# Patient Record
Sex: MALE | Race: WHITE | NOT HISPANIC OR LATINO | Employment: FULL TIME | URBAN - METROPOLITAN AREA
[De-identification: names, ages, dates, MRNs, and addresses within clinical notes are randomized per-mention and may not be internally consistent; named-entity substitution may affect disease eponyms.]

---

## 2022-08-24 LAB
ALBUMIN SERPL BCG-MCNC: 4.4 G/DL (ref 3.5–5.2)
ALP SERPL-CCNC: 78 U/L (ref 40–129)
ALT SERPL W P-5'-P-CCNC: 31 U/L (ref 10–50)
ANION GAP SERPL CALCULATED.3IONS-SCNC: 12 MMOL/L (ref 7–15)
AST SERPL W P-5'-P-CCNC: 28 U/L (ref 10–50)
BASOPHILS # BLD AUTO: 0 10E3/UL (ref 0–0.2)
BASOPHILS NFR BLD AUTO: 0 %
BILIRUB SERPL-MCNC: 0.8 MG/DL
BUN SERPL-MCNC: 14.6 MG/DL (ref 6–20)
CALCIUM SERPL-MCNC: 10 MG/DL (ref 8.6–10)
CHLORIDE SERPL-SCNC: 101 MMOL/L (ref 98–107)
CREAT SERPL-MCNC: 0.9 MG/DL (ref 0.67–1.17)
DEPRECATED HCO3 PLAS-SCNC: 23 MMOL/L (ref 22–29)
EOSINOPHIL # BLD AUTO: 0.1 10E3/UL (ref 0–0.7)
EOSINOPHIL NFR BLD AUTO: 0 %
ERYTHROCYTE [DISTWIDTH] IN BLOOD BY AUTOMATED COUNT: 12.8 % (ref 10–15)
GFR SERPL CREATININE-BSD FRML MDRD: >90 ML/MIN/1.73M2
GLUCOSE SERPL-MCNC: 125 MG/DL (ref 70–99)
HCT VFR BLD AUTO: 49 % (ref 40–53)
HGB BLD-MCNC: 16.7 G/DL (ref 13.3–17.7)
HOLD SPECIMEN: NORMAL
IMM GRANULOCYTES # BLD: 0 10E3/UL
IMM GRANULOCYTES NFR BLD: 0 %
LIPASE SERPL-CCNC: 631 U/L (ref 13–60)
LYMPHOCYTES # BLD AUTO: 2.2 10E3/UL (ref 0.8–5.3)
LYMPHOCYTES NFR BLD AUTO: 16 %
MCH RBC QN AUTO: 29.6 PG (ref 26.5–33)
MCHC RBC AUTO-ENTMCNC: 34.1 G/DL (ref 31.5–36.5)
MCV RBC AUTO: 87 FL (ref 78–100)
MONOCYTES # BLD AUTO: 1.8 10E3/UL (ref 0–1.3)
MONOCYTES NFR BLD AUTO: 13 %
NEUTROPHILS # BLD AUTO: 9.5 10E3/UL (ref 1.6–8.3)
NEUTROPHILS NFR BLD AUTO: 71 %
NRBC # BLD AUTO: 0 10E3/UL
NRBC BLD AUTO-RTO: 0 /100
PLATELET # BLD AUTO: 211 10E3/UL (ref 150–450)
POTASSIUM SERPL-SCNC: 4.2 MMOL/L (ref 3.4–5.3)
PROT SERPL-MCNC: 8.1 G/DL (ref 6.4–8.3)
RBC # BLD AUTO: 5.65 10E6/UL (ref 4.4–5.9)
SODIUM SERPL-SCNC: 136 MMOL/L (ref 136–145)
TROPONIN T SERPL HS-MCNC: <6 NG/L
WBC # BLD AUTO: 13.6 10E3/UL (ref 4–11)

## 2022-08-24 PROCEDURE — 84484 ASSAY OF TROPONIN QUANT: CPT | Performed by: EMERGENCY MEDICINE

## 2022-08-24 PROCEDURE — 85025 COMPLETE CBC W/AUTO DIFF WBC: CPT | Performed by: EMERGENCY MEDICINE

## 2022-08-24 PROCEDURE — 83690 ASSAY OF LIPASE: CPT | Performed by: EMERGENCY MEDICINE

## 2022-08-24 PROCEDURE — 80053 COMPREHEN METABOLIC PANEL: CPT | Performed by: EMERGENCY MEDICINE

## 2022-08-24 PROCEDURE — 93005 ELECTROCARDIOGRAM TRACING: CPT

## 2022-08-24 PROCEDURE — 99284 EMERGENCY DEPT VISIT MOD MDM: CPT

## 2022-08-24 PROCEDURE — 36415 COLL VENOUS BLD VENIPUNCTURE: CPT | Performed by: EMERGENCY MEDICINE

## 2022-08-25 ENCOUNTER — HOSPITAL ENCOUNTER (EMERGENCY)
Facility: CLINIC | Age: 42
Discharge: HOME | End: 2022-08-25
Attending: EMERGENCY MEDICINE | Admitting: EMERGENCY MEDICINE
Payer: OTHER GOVERNMENT

## 2022-08-25 VITALS
TEMPERATURE: 97.9 F | HEIGHT: 70 IN | WEIGHT: 215 LBS | OXYGEN SATURATION: 98 % | BODY MASS INDEX: 30.78 KG/M2 | RESPIRATION RATE: 18 BRPM | HEART RATE: 79 BPM | SYSTOLIC BLOOD PRESSURE: 134 MMHG | DIASTOLIC BLOOD PRESSURE: 101 MMHG

## 2022-08-25 DIAGNOSIS — K85.00 IDIOPATHIC ACUTE PANCREATITIS WITHOUT INFECTION OR NECROSIS: ICD-10-CM

## 2022-08-25 LAB
ATRIAL RATE - MUSE: 81 BPM
DIASTOLIC BLOOD PRESSURE - MUSE: NORMAL MMHG
HOLD SPECIMEN: NORMAL
INTERPRETATION ECG - MUSE: NORMAL
P AXIS - MUSE: 13 DEGREES
PR INTERVAL - MUSE: 170 MS
QRS DURATION - MUSE: 140 MS
QT - MUSE: 394 MS
QTC - MUSE: 457 MS
R AXIS - MUSE: 6 DEGREES
SYSTOLIC BLOOD PRESSURE - MUSE: NORMAL MMHG
T AXIS - MUSE: 18 DEGREES
VENTRICULAR RATE- MUSE: 81 BPM

## 2022-08-25 PROCEDURE — 250N000011 HC RX IP 250 OP 636: Performed by: EMERGENCY MEDICINE

## 2022-08-25 PROCEDURE — 250N000013 HC RX MED GY IP 250 OP 250 PS 637: Performed by: EMERGENCY MEDICINE

## 2022-08-25 RX ORDER — ONDANSETRON 4 MG/1
4 TABLET, ORALLY DISINTEGRATING ORAL ONCE
Status: COMPLETED | OUTPATIENT
Start: 2022-08-25 | End: 2022-08-25

## 2022-08-25 RX ORDER — OXYCODONE HYDROCHLORIDE 5 MG/1
5 TABLET ORAL EVERY 6 HOURS PRN
Qty: 12 TABLET | Refills: 0 | Status: SHIPPED | OUTPATIENT
Start: 2022-08-25 | End: 2023-02-16

## 2022-08-25 RX ORDER — OXYCODONE HYDROCHLORIDE 5 MG/1
5 TABLET ORAL ONCE
Status: COMPLETED | OUTPATIENT
Start: 2022-08-25 | End: 2022-08-25

## 2022-08-25 RX ORDER — ONDANSETRON 4 MG/1
4 TABLET, ORALLY DISINTEGRATING ORAL EVERY 6 HOURS PRN
Qty: 12 TABLET | Refills: 0 | Status: SHIPPED | OUTPATIENT
Start: 2022-08-25 | End: 2022-08-28

## 2022-08-25 RX ADMIN — OXYCODONE HYDROCHLORIDE 5 MG: 5 TABLET ORAL at 01:22

## 2022-08-25 RX ADMIN — ONDANSETRON 4 MG: 4 TABLET, ORALLY DISINTEGRATING ORAL at 01:22

## 2022-08-25 ASSESSMENT — ENCOUNTER SYMPTOMS
NAUSEA: 1
BACK PAIN: 1
VOMITING: 0
ABDOMINAL PAIN: 1
MYALGIAS: 1

## 2022-08-25 ASSESSMENT — ACTIVITIES OF DAILY LIVING (ADL): ADLS_ACUITY_SCORE: 35

## 2022-08-25 NOTE — ED TRIAGE NOTES
Here for epigastric abdominal pain radiating to back and left shoulder on Monday morning while drinking coffee associated with nausea. Pain resolve when not drinking coffee. Same pain occur again on Tuesday morning when drinking coffee at that time. Denies any chest pain. feels constipated, last normal bowel movement on Tuesday morning, tried metamucil but not helping. ABCs intact.      Triage Assessment     Row Name 08/24/22 8766       Triage Assessment (Adult)    Airway WDL WDL       Respiratory WDL    Respiratory WDL WDL       Cardiac WDL    Cardiac WDL WDL

## 2022-08-25 NOTE — ED PROVIDER NOTES
"  History   Chief Complaint:  Abdominal Pain    The history is provided by the patient.      Deandre Lemus is a 42 year old male who presents with abdominal pain. The patient reports onset of epigastric abdominal pain three days ago that resolved itself but presented again two days ago. States the pain radiates to his left shoulder and back. Adds that the pain prevents him from sleeping and has caused him to be nauseous. Notes that use of a heating pad helped alleviate some discomfort. Endorses minimal alcohol use. Denies chest pain, vomiting, and history of abdominal surgery.    Review of Systems   Cardiovascular: Negative for chest pain.   Gastrointestinal: Positive for abdominal pain and nausea. Negative for vomiting.   Musculoskeletal: Positive for back pain and myalgias.   All other systems reviewed and are negative.    Allergies:  No Known Allergies    Medications:  No Known Medications    Past Medical History:     No Known Past Medical History    Past Surgical History:    Vasectomy    Social History:  Presents with spouse  Presents via private vehicle     Physical Exam     Patient Vitals for the past 24 hrs:   BP Temp Temp src Pulse Resp SpO2 Height Weight   08/24/22 2305  144/95 97.9  F (36.6  C) Oral 84 18 97 % 1.778 m (5' 10\") 97.5 kg (215 lb)     Physical Exam  Nursing note and vitals reviewed.  Constitutional: Cooperative.   HENT:   Mouth/Throat: Mucous membranes are normal.   Cardiovascular: Normal rate, regular rhythm and normal heart sounds.  No murmur.  Pulmonary/Chest: Effort normal and breath sounds normal. No respiratory distress. No wheezes. No rales.   Abdominal: Soft. Normal appearance and bowel sounds are normal. No distension. Mild epigastric tenderness. There is no rigidity and no guarding. No tenderness with deep RUQ palpation.    Neurological: Alert. Oriented x4  Skin: Skin is warm and dry.    Psychiatric: Normal mood and affect.     Emergency Department Course   ECG  ECG taken at " 2301, ECG read at 2304  Normal sinus rhythm  Right bundle branch block   Rate 81 bpm. PA interval 170 ms. QRS duration 140 ms. QT/QTc 394/457 ms. P-R-T axes 13 6 18.     Laboratory:  Labs Ordered and Resulted from Time of ED Arrival to Time of ED Departure   LIPASE - Abnormal       Result Value    Lipase 631 (*)    COMPREHENSIVE METABOLIC PANEL - Abnormal    Sodium 136      Potassium 4.2      Creatinine 0.90      Urea Nitrogen 14.6      Chloride 101      Carbon Dioxide (CO2) 23      Anion Gap 12      Glucose 125 (*)     Calcium 10.0      Protein Total 8.1      Albumin 4.4      Bilirubin Total 0.8      Alkaline Phosphatase 78      AST 28      ALT 31      GFR Estimate >90     CBC WITH PLATELETS AND DIFFERENTIAL - Abnormal    WBC Count 13.6 (*)     RBC Count 5.65      Hemoglobin 16.7      Hematocrit 49.0      MCV 87      MCH 29.6      MCHC 34.1      RDW 12.8      Platelet Count 211      % Neutrophils 71      % Lymphocytes 16      % Monocytes 13      % Eosinophils 0      % Basophils 0      % Immature Granulocytes 0      NRBCs per 100 WBC 0      Absolute Neutrophils 9.5 (*)     Absolute Lymphocytes 2.2      Absolute Monocytes 1.8 (*)     Absolute Eosinophils 0.1      Absolute Basophils 0.0      Absolute Immature Granulocytes 0.0      Absolute NRBCs 0.0     TROPONIN T, HIGH SENSITIVITY - Normal    Troponin T, High Sensitivity <6       Reviewed:  I reviewed nursing notes, vitals and past medical history    Assessments:  0108 I obtained history and examined the patient as noted above.    Interventions:  0122 Oxycodone, 5 mg, PO  0122 Ondansetron, 4 mg, PO    Disposition:  The patient was discharged to home.     Impression & Plan   Medical Decision Making:  Deandre Lemus is a 42 year old male who presents with epigastric abdominal pain.  The differential diagnosis would include GERD, GIB, esophageal spasm, atypical cardiac sx's, pancreatitis, biliary colic or gallstone disease, AAA, gastroenteritis, gastritis, large vs  small bowel disease, etc.  Based on history, PE and labs, the most likely explanation is pancreatitis.  Ultrasound of gallbladder deferred after collaborative conversation with the patient. I feel gallstones less likely.   Abdominal exam is benign at this point.   Pain control in ED was successful.  Based on this, will discharge home.  Patients questions answered.      Diagnosis:    ICD-10-CM    1. Idiopathic acute pancreatitis without infection or necrosis  K85.00      Discharge Medications:  New Prescriptions    ONDANSETRON (ZOFRAN ODT) 4 MG ODT TAB    Take 1 tablet (4 mg) by mouth every 6 hours as needed    OXYCODONE (ROXICODONE) 5 MG TABLET    Take 1 tablet (5 mg) by mouth every 6 hours as needed for severe pain     Scribe Disclosure:  I, Tavia Hendricks, am serving as a scribe at 1:08 AM on 8/25/2022 to document services personally performed by Evelio Cardoso MD based on my observations and the provider's statements to me.      Evelio Cardoso MD  08/25/22 [id number]

## 2022-09-26 ENCOUNTER — TRANSFERRED RECORDS (OUTPATIENT)
Dept: HEALTH INFORMATION MANAGEMENT | Facility: CLINIC | Age: 42
End: 2022-09-26

## 2022-10-04 ENCOUNTER — OFFICE VISIT (OUTPATIENT)
Dept: PEDIATRICS | Facility: CLINIC | Age: 42
End: 2022-10-04
Payer: OTHER GOVERNMENT

## 2022-10-04 VITALS
HEART RATE: 82 BPM | RESPIRATION RATE: 12 BRPM | TEMPERATURE: 98.8 F | SYSTOLIC BLOOD PRESSURE: 112 MMHG | DIASTOLIC BLOOD PRESSURE: 60 MMHG | OXYGEN SATURATION: 96 % | HEIGHT: 70 IN | WEIGHT: 215 LBS | BODY MASS INDEX: 30.78 KG/M2

## 2022-10-04 DIAGNOSIS — K85.90 ACUTE PANCREATITIS, UNSPECIFIED COMPLICATION STATUS, UNSPECIFIED PANCREATITIS TYPE: Primary | ICD-10-CM

## 2022-10-04 DIAGNOSIS — Z13.220 SCREENING CHOLESTEROL LEVEL: ICD-10-CM

## 2022-10-04 DIAGNOSIS — Z11.59 NEED FOR HEPATITIS C SCREENING TEST: ICD-10-CM

## 2022-10-04 DIAGNOSIS — Z13.220 SCREENING FOR HYPERLIPIDEMIA: ICD-10-CM

## 2022-10-04 DIAGNOSIS — Z13.1 SCREENING FOR DIABETES MELLITUS: ICD-10-CM

## 2022-10-04 DIAGNOSIS — Z11.4 SCREENING FOR HIV (HUMAN IMMUNODEFICIENCY VIRUS): ICD-10-CM

## 2022-10-04 LAB — HBA1C MFR BLD: 5.5 % (ref 0–5.6)

## 2022-10-04 PROCEDURE — 87389 HIV-1 AG W/HIV-1&-2 AB AG IA: CPT | Performed by: STUDENT IN AN ORGANIZED HEALTH CARE EDUCATION/TRAINING PROGRAM

## 2022-10-04 PROCEDURE — 86803 HEPATITIS C AB TEST: CPT | Performed by: STUDENT IN AN ORGANIZED HEALTH CARE EDUCATION/TRAINING PROGRAM

## 2022-10-04 PROCEDURE — 83036 HEMOGLOBIN GLYCOSYLATED A1C: CPT | Performed by: STUDENT IN AN ORGANIZED HEALTH CARE EDUCATION/TRAINING PROGRAM

## 2022-10-04 PROCEDURE — 99203 OFFICE O/P NEW LOW 30 MIN: CPT | Performed by: STUDENT IN AN ORGANIZED HEALTH CARE EDUCATION/TRAINING PROGRAM

## 2022-10-04 PROCEDURE — 36415 COLL VENOUS BLD VENIPUNCTURE: CPT | Performed by: STUDENT IN AN ORGANIZED HEALTH CARE EDUCATION/TRAINING PROGRAM

## 2022-10-04 PROCEDURE — 80061 LIPID PANEL: CPT | Performed by: STUDENT IN AN ORGANIZED HEALTH CARE EDUCATION/TRAINING PROGRAM

## 2022-10-04 NOTE — LETTER
October 6, 2022      Deandre Lemus  [address]  [place] [address]        Dear Deandre,     Here are your recent lab results:     Your standard one-time screening for HIV was normal.     Your standard one-time screening for hepatitis C virus was negative, or normal.     Your cholesterol levels look good! The LDL is actually normal.     Your hemoglobin a1c (screens for diabetes) was normal.     Resulted Orders   HIV Antigen Antibody Combo   Result Value Ref Range    HIV Antigen Antibody Combo Nonreactive Nonreactive      Comment:      HIV-1 p24 Ag & HIV-1/HIV-2 Ab Not Detected   Hepatitis C Screen Reflex to HCV RNA Quant and Genotype   Result Value Ref Range    Hepatitis C Antibody Nonreactive Nonreactive    Narrative    Assay performance characteristics have not been established for newborns, infants, and children.   Lipid panel reflex to direct LDL Non-fasting   Result Value Ref Range    Cholesterol 181 <200 mg/dL    Triglycerides 172 (H) <150 mg/dL    Direct Measure HDL 44 >=40 mg/dL    LDL Cholesterol Calculated 103 (H) <=100 mg/dL    Non HDL Cholesterol 137 (H) <130 mg/dL    Patient Fasting > 8hrs? No     Narrative    Cholesterol  Desirable:  <200 mg/dL    Triglycerides  Normal:  Less than 150 mg/dL  Borderline High:  150-199 mg/dL  High:  200-499 mg/dL  Very High:  Greater than or equal to 500 mg/dL    Direct Measure HDL  Female:  Greater than or equal to 50 mg/dL   Male:  Greater than or equal to 40 mg/dL    LDL Cholesterol  Desirable:  <100mg/dL  Above Desirable:  100-129 mg/dL   Borderline High:  130-159 mg/dL   High:  160-189 mg/dL   Very High:  >= 190 mg/dL    Non HDL Cholesterol  Desirable:  130 mg/dL  Above Desirable:  130-159 mg/dL  Borderline High:  160-189 mg/dL  High:  190-219 mg/dL  Very High:  Greater than or equal to 220 mg/dL   Hemoglobin A1c   Result Value Ref Range    Hemoglobin A1C 5.5 0.0 - 5.6 %      Comment:      Normal <5.7%   Prediabetes 5.7-6.4%    Diabetes 6.5% or higher     Note:  Adopted from ADA consensus guidelines.       Please let us know if you have questions or concerns.           Meche Beckman MD   Internal Medicine & Pediatrics   [facility]

## 2022-10-04 NOTE — PROGRESS NOTES
"  Assessment & Plan   Acute pancreatitis  Getting MRCP with [facility]. No/minimal symptoms currently. Likely alcohol related and does not require further testing for autoimmune causes are alpha1 antitrypsin deficiency.     Screening for HIV (human immunodeficiency virus)  - HIV Antigen Antibody Combo  - HIV Antigen Antibody Combo    Need for hepatitis C screening test  - Hepatitis C Screen Reflex to HCV RNA Quant and Genotype  - Hepatitis C Screen Reflex to HCV RNA Quant and Genotype    Screening for hyperlipidemia  - Lipid panel reflex to direct LDL Non-fasting  - Lipid panel reflex to direct LDL Non-fasting    Screening for diabetes mellitus  - Hemoglobin A1c  - Hemoglobin A1c    Screening cholesterol level   - lipid panel    ED Follow up of Pancreatitis  Patient is scheduled for MRCP this Saturday at [facility]. Currently already following appropriate diet and lifestyle modifications. Unclear if his supplement use may have also contributed to his flare. Possibly alcohol induced based on social hx.     Review of prior external note(s) from [facility] ED and [facility] referral order         BMI:   Estimated body mass index is 30.85 kg/m  as calculated from the following:    Height as of this encounter: 1.778 m (5' 10\").    Weight as of this encounter: 97.5 kg (215 lb).       Patient was seen and discussed with Dr. Mcgee.    Return in about 4 months (around 2/16/2023).    STAFF NOTE:  I have seen the patient, discussed with the resident, was present during critical portion of visit, and was available to furnish services throughout the visit.  I agree with the history, physical and plan as documented above.    Meche Mcgee MD  Internal Medicine-Pediatrics        Mickey Carrera is a 42 year old, presenting for the following health issues:  Hospital F/U and Establish Care      [facility]       ED/UC Followup:  Pt looking to establish to care     Facility:  [facility] ER  Date of visit: " "08/25/2022   Reason for visit: Abdominal pain/Acute pancreatitis   Current Status: Pt states \"The pain comes and goes, not as severe but it is still there\"     Has itching with oxycodone or percocet. Has not been using much of anything. Uses Motrin if needed. Heat seems to help. Movement     Pain gets 1-3 but he feels the pain daily    He is scheduled for an MRCP on 10/8/22 at [facility]  He has cut down on sugar and coffee. Tried to cut out fatty foods. Trying to avoid a lot of carbs. Stopped drinking alcohol.   No ep of Nausea or vomiting since the ED visit.     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 He now is an casual drinker.         Quit smoking 2010. Used smokeless tobacco till 2017. Admits that when he found out about his dad's cancer diagnosis, he drank more heavily to manage his emotions. He has not have any alcohol since mid-August 2022.          Works as [employer] team     to wife, Kids           Objective    /60 (BP Location: Right arm, Patient Position: Sitting, Cuff Size: Adult Large)   Pulse 82   Temp 98.8  F (37.1  C) (Temporal)   Resp 12   Ht 1.778 m (5' 10\")   Wt 97.5 kg (215 lb)   SpO2 96%   BMI 30.85 kg/m    Body mass index is 30.85 kg/m .  Physical Exam   GEN: Alert and appropriately interactive for age  EYES: Eyes grossly " normal to inspection, conjunctivae and sclerae normal  RESP: Breathing comfortably on room air   CV: Warm and well perfused peripheral extremities   MS: no gross musculoskeletal defects noted, no edema  NEURO: Alert and oriented. Speech fluent.    PSYCH: Affect normal/bright. Appearance well groomed.

## 2022-10-05 LAB
CHOLEST SERPL-MCNC: 181 MG/DL
FASTING STATUS PATIENT QL REPORTED: NO
HCV AB SERPL QL IA: NONREACTIVE
HDLC SERPL-MCNC: 44 MG/DL
HIV 1+2 AB+HIV1 P24 AG SERPL QL IA: NONREACTIVE
LDLC SERPL CALC-MCNC: 103 MG/DL
NONHDLC SERPL-MCNC: 137 MG/DL
TRIGL SERPL-MCNC: 172 MG/DL

## 2022-10-09 ENCOUNTER — HOSPITAL ENCOUNTER (OUTPATIENT)
Dept: MRI IMAGING | Facility: CLINIC | Age: 42
Discharge: HOME | End: 2022-10-09
Attending: INTERNAL MEDICINE | Admitting: INTERNAL MEDICINE
Payer: OTHER GOVERNMENT

## 2022-10-09 DIAGNOSIS — Z87.19 H/O ACUTE PANCREATITIS: ICD-10-CM

## 2022-10-09 LAB — RADIOLOGIST FLAGS: ABNORMAL

## 2022-10-09 PROCEDURE — A9585 GADOBUTROL INJECTION: HCPCS | Performed by: INTERNAL MEDICINE

## 2022-10-09 PROCEDURE — 74183 MRI ABD W/O CNTR FLWD CNTR: CPT

## 2022-10-09 PROCEDURE — 255N000002 HC RX 255 OP 636: Performed by: INTERNAL MEDICINE

## 2022-10-09 RX ORDER — GADOBUTROL 604.72 MG/ML
9.5 INJECTION INTRAVENOUS ONCE
Status: COMPLETED | OUTPATIENT
Start: 2022-10-09 | End: 2022-10-09

## 2022-10-09 RX ADMIN — GADOBUTROL 9.5 ML: 604.72 INJECTION INTRAVENOUS at 09:52

## 2022-10-09 NOTE — RESULT ENCOUNTER NOTE
Cystic lesion at pancreatic head, distal esophageal mass, hepatic steatosis.  Endoscopic ultrasound with biopsy recommended.  This study was ordered by [facility] provider.  I will call patient to make sure results have been relayed and that follow up with [facility] is set up.    Meche Mcgee MD

## 2022-10-11 ENCOUNTER — TRANSFERRED RECORDS (OUTPATIENT)
Dept: HEALTH INFORMATION MANAGEMENT | Facility: CLINIC | Age: 42
End: 2022-10-11

## 2022-10-28 ENCOUNTER — OFFICE VISIT (OUTPATIENT)
Dept: PEDIATRICS | Facility: CLINIC | Age: 42
End: 2022-10-28
Payer: OTHER GOVERNMENT

## 2022-10-28 VITALS
HEART RATE: 78 BPM | HEIGHT: 70 IN | SYSTOLIC BLOOD PRESSURE: 126 MMHG | BODY MASS INDEX: 31.62 KG/M2 | OXYGEN SATURATION: 98 % | WEIGHT: 220.9 LBS | RESPIRATION RATE: 17 BRPM | TEMPERATURE: 97.6 F | DIASTOLIC BLOOD PRESSURE: 78 MMHG

## 2022-10-28 DIAGNOSIS — Z01.818 PREOP GENERAL PHYSICAL EXAM: Primary | ICD-10-CM

## 2022-10-28 DIAGNOSIS — K22.89 ESOPHAGEAL MASS: ICD-10-CM

## 2022-10-28 DIAGNOSIS — Z01.812 ENCOUNTER FOR SCREENING LABORATORY TESTING FOR COVID-19 VIRUS IN ASYMPTOMATIC PATIENT: ICD-10-CM

## 2022-10-28 DIAGNOSIS — K86.2 PANCREAS CYST: ICD-10-CM

## 2022-10-28 DIAGNOSIS — Z11.52 ENCOUNTER FOR SCREENING LABORATORY TESTING FOR COVID-19 VIRUS IN ASYMPTOMATIC PATIENT: ICD-10-CM

## 2022-10-28 PROCEDURE — 99214 OFFICE O/P EST MOD 30 MIN: CPT | Mod: 25 | Performed by: STUDENT IN AN ORGANIZED HEALTH CARE EDUCATION/TRAINING PROGRAM

## 2022-10-28 PROCEDURE — U0003 INFECTIOUS AGENT DETECTION BY NUCLEIC ACID (DNA OR RNA); SEVERE ACUTE RESPIRATORY SYNDROME CORONAVIRUS 2 (SARS-COV-2) (CORONAVIRUS DISEASE [COVID-19]), AMPLIFIED PROBE TECHNIQUE, MAKING USE OF HIGH THROUGHPUT TECHNOLOGIES AS DESCRIBED BY CMS-2020-01-R: HCPCS | Performed by: STUDENT IN AN ORGANIZED HEALTH CARE EDUCATION/TRAINING PROGRAM

## 2022-10-28 PROCEDURE — U0005 INFEC AGEN DETEC AMPLI PROBE: HCPCS | Performed by: STUDENT IN AN ORGANIZED HEALTH CARE EDUCATION/TRAINING PROGRAM

## 2022-10-28 SDOH — HEALTH STABILITY: PHYSICAL HEALTH: ON AVERAGE, HOW MANY MINUTES DO YOU ENGAGE IN EXERCISE AT THIS LEVEL?: 50 MIN

## 2022-10-28 SDOH — HEALTH STABILITY: PHYSICAL HEALTH: ON AVERAGE, HOW MANY DAYS PER WEEK DO YOU ENGAGE IN MODERATE TO STRENUOUS EXERCISE (LIKE A BRISK WALK)?: 5 DAYS

## 2022-10-28 SDOH — ECONOMIC STABILITY: TRANSPORTATION INSECURITY
IN THE PAST 12 MONTHS, HAS LACK OF TRANSPORTATION KEPT YOU FROM MEETINGS, WORK, OR FROM GETTING THINGS NEEDED FOR DAILY LIVING?: NO

## 2022-10-28 SDOH — ECONOMIC STABILITY: FOOD INSECURITY: WITHIN THE PAST 12 MONTHS, THE FOOD YOU BOUGHT JUST DIDN'T LAST AND YOU DIDN'T HAVE MONEY TO GET MORE.: NEVER TRUE

## 2022-10-28 SDOH — ECONOMIC STABILITY: INCOME INSECURITY: IN THE LAST 12 MONTHS, WAS THERE A TIME WHEN YOU WERE NOT ABLE TO PAY THE MORTGAGE OR RENT ON TIME?: NO

## 2022-10-28 SDOH — ECONOMIC STABILITY: TRANSPORTATION INSECURITY
IN THE PAST 12 MONTHS, HAS THE LACK OF TRANSPORTATION KEPT YOU FROM MEDICAL APPOINTMENTS OR FROM GETTING MEDICATIONS?: NO

## 2022-10-28 SDOH — ECONOMIC STABILITY: FOOD INSECURITY: WITHIN THE PAST 12 MONTHS, YOU WORRIED THAT YOUR FOOD WOULD RUN OUT BEFORE YOU GOT MONEY TO BUY MORE.: NEVER TRUE

## 2022-10-28 SDOH — ECONOMIC STABILITY: INCOME INSECURITY: HOW HARD IS IT FOR YOU TO PAY FOR THE VERY BASICS LIKE FOOD, HOUSING, MEDICAL CARE, AND HEATING?: NOT VERY HARD

## 2022-10-28 ASSESSMENT — LIFESTYLE VARIABLES
HOW OFTEN DO YOU HAVE SIX OR MORE DRINKS ON ONE OCCASION: LESS THAN MONTHLY
AUDIT-C TOTAL SCORE: 2
HOW OFTEN DO YOU HAVE A DRINK CONTAINING ALCOHOL: MONTHLY OR LESS
HOW MANY STANDARD DRINKS CONTAINING ALCOHOL DO YOU HAVE ON A TYPICAL DAY: 1 OR 2
SKIP TO QUESTIONS 9-10: 0

## 2022-10-28 ASSESSMENT — SOCIAL DETERMINANTS OF HEALTH (SDOH)
HOW OFTEN DO YOU ATTEND CHURCH OR RELIGIOUS SERVICES?: NEVER
HOW OFTEN DO YOU GET TOGETHER WITH FRIENDS OR RELATIVES?: ONCE A WEEK
DO YOU BELONG TO ANY CLUBS OR ORGANIZATIONS SUCH AS CHURCH GROUPS UNIONS, FRATERNAL OR ATHLETIC GROUPS, OR SCHOOL GROUPS?: YES
IN A TYPICAL WEEK, HOW MANY TIMES DO YOU TALK ON THE PHONE WITH FAMILY, FRIENDS, OR NEIGHBORS?: ONCE A WEEK

## 2022-10-28 ASSESSMENT — PAIN SCALES - GENERAL: PAINLEVEL: NO PAIN (0)

## 2022-10-28 NOTE — PROGRESS NOTES
[facility]  [address]  [address]  [place] [address]  Phone: [phone]  Fax: [phone]  Primary Provider: Meche Amador  Pre-op Performing Provider: MECHE AMADOR      PREOPERATIVE EVALUATION:  Today's date: 10/28/2022    Deandre Lemus is a 42 year old male who presents for a preoperative evaluation.    Surgical Information:  Surgery/Procedure: endoscopic ultra sound    Surgery Location: [facility]  Surgeon: Dr. Crowley   Surgery Date: 10/31/2022  Time of Surgery: 11:00am   Where patient plans to recover: At home with family  Fax number for surgical facility:     Type of Anesthesia Anticipated: General    Assessment & Plan     The proposed surgical procedure is considered INTERMEDIATE risk.    Preop general physical exam  Medically optimized for procedure. Asymptomatic COVID19 PCR in process.    Pancreas cys  Esophageal mass  Undergoing EUS       Risks and Recommendations:  The patient has the following additional risks and recommendations for perioperative complications:   - No identified additional risk factors other than previously addressed    Medication Instructions:  Patient is on no chronic medications    RECOMMENDATION:  APPROVAL GIVEN to proceed with proposed procedure, without further diagnostic evaluation.        Subjective     HPI related to upcoming procedure: Had episode of pancreatitis and patient had follow up MRCP with [facility], Found cystic lesion at pancreatic head and distal esophageal mass. Will be undergoing endoscopic ultrasound and possible biopsy.    Preop Questions 10/28/2022   1. Have you ever had a heart attack or stroke? No   2. Have you ever had surgery on your heart or blood vessels, such as a stent placement, a coronary artery bypass, or surgery on an artery in your head, neck, heart, or legs? No   3. Do you have chest pain with activity? No   4. Do you have a history of  heart failure? No   5. Do  you currently have a cold, bronchitis or symptoms of other infection? No   6. Do you have a cough, shortness of breath, or wheezing? No   7. Do you or anyone in your family have previous history of blood clots? YES - sister had history of it    8. Do you or does anyone in your family have a serious bleeding problem such as prolonged bleeding following surgeries or cuts? No   9. Have you ever had problems with anemia or been told to take iron pills? No   10. Have you had any abnormal blood loss such as black, tarry or bloody stools? No   11. Have you ever had a blood transfusion? No   12. Are you willing to have a blood transfusion if it is medically needed before, during, or after your surgery? Yes   13. Have you or any of your relatives ever had problems with anesthesia? UNKNOWN   14. Do you have sleep apnea, excessive snoring or daytime drowsiness? No   15. Do you have any artifical heart valves or other implanted medical devices like a pacemaker, defibrillator, or continuous glucose monitor? No   16. Do you have artificial joints? No   17. Are you allergic to latex? No       Preoperative Review of :   reviewed - controlled substances prescribed by other outside provider(s).      Status of Chronic Conditions:  See problem list for active medical problems.  Problems all longstanding and stable, except as noted/documented.  See ROS for pertinent symptoms related to these conditions.      Review of Systems  Constitutional, neuro, ENT, endocrine, pulmonary, cardiac, gastrointestinal, genitourinary, musculoskeletal, integument and psychiatric systems are negative, except as otherwise noted.    Patient Active Problem List    Diagnosis Date Noted     Acute pancreatitis 10/04/2022     Priority: Medium     Likely alcohol related  Getting MRCP with [facility]         Past Medical History:   Diagnosis Date     Closed fracture of left hip with routine healing     2007     RBBB (right bundle branch block)   "    Past Surgical History:   Procedure Laterality Date     VASECTOMY       WISDOM TOOTH EXTRACTION       Current Outpatient Medications   Medication Sig Dispense Refill     COVID-19 mRNA vacc, Moderna, 100 MCG/0.5ML injection        oxyCODONE (ROXICODONE) 5 MG tablet Take 1 tablet (5 mg) by mouth every 6 hours as needed for severe pain 12 tablet 0       No Known Allergies     Social History     Tobacco Use     Smoking status: Former     Types: Cigarettes     Smokeless tobacco: Not on file   Substance Use Topics     Alcohol use: Yes     History   Drug Use Unknown         Objective     /78 (BP Location: Right arm, Patient Position: Sitting, Cuff Size: Adult Large)   Pulse 78   Temp 97.6  F (36.4  C) (Tympanic)   Resp 17   Ht 1.778 m (5' 10\")   Wt 100.2 kg (220 lb 14.4 oz)   SpO2 98%   BMI 31.70 kg/m      Physical Exam  GENERAL APPEARANCE: healthy, alert and no distress  HENT: ear canals and TM's normal and nose and mouth without ulcers or lesions  RESP: lungs clear to auscultation - no rales, rhonchi or wheezes  CV: regular rate and rhythm, normal S1 S2, no S3 or S4 and no murmur, click or rub   ABDOMEN: soft, nontender, no HSM or masses  NEURO: Normal strength and tone, sensory exam grossly normal, mentation intact and speech normal    Recent Labs   Lab Test 10/04/22  1446 08/24/22  2319   HGB  --  16.7   PLT  --  211   NA  --  136   POTASSIUM  --  4.2   CR  --  0.90   A1C 5.5  --         Diagnostics:  No labs were ordered during this visit.   No EKG required for low risk surgery (cataract, skin procedure, breast biopsy, etc).    Revised Cardiac Risk Index (RCRI):  The patient has the following serious cardiovascular risks for perioperative complications:   - No serious cardiac risks = 0 points     RCRI Interpretation: 0 points: Class I (very low risk - 0.4% complication rate)           Signed Electronically by: Meche Mcgee MD  Copy of this evaluation report is provided to requesting " physician.

## 2022-10-28 NOTE — PATIENT INSTRUCTIONS
Preparing for Your Surgery  Getting started  A nurse will call you to review your health history and instructions. They will give you an arrival time based on your scheduled surgery time. Please be ready to share:    Your doctor's clinic name and phone number    Your medical, surgical and anesthesia history    A list of allergies and sensitivities    A list of medicines, including herbal treatments and over-the-counter drugs    Whether the patient has a legal guardian (ask how to send us the papers in advance)  Please tell us if you're pregnant--or if there's any chance you might be pregnant. Some surgeries may injure a fetus (unborn baby), so they require a pregnancy test. Surgeries that are safe for a fetus don't always need a test, and you can choose whether to have one.   If you have a child who's having surgery, please ask for a copy of Preparing for Your Child's Surgery.    Preparing for surgery    Within 10 to 30 days of surgery: Have a pre-op exam (sometimes called an H&P, or History and Physical). This can be done at a clinic or pre-operative center.  ? If you're having a , you may not need this exam. Talk to your care team.    At your pre-op exam, talk to your care team about all medicines you take. If you need to stop any medicines before surgery, ask when to start taking them again.  ? We do this for your safety. Many medicines can make you bleed too much during surgery. Some change how well surgery (anesthesia) drugs work.    Call your insurance company to let them know you're having surgery. (If you don't have insurance, call [phone].)    Call your clinic if there's any change in your health. This includes signs of a cold or flu (sore throat, runny nose, cough, rash, fever). It also includes a scrape or scratch near the surgery site.    If you have questions on the day of surgery, call your hospital or surgery center.  COVID testing  You may need to be tested for COVID-19 before having  surgery. If so, we will give you instructions (or click here).  Eating and drinking guidelines  For your safety: Unless your surgeon tells you otherwise, follow the guidelines below.    Eat and drink as usual until 8 hours before surgery. After that, no food or milk.    Drink clear liquids until 2 hours before surgery. These are liquids you can see through, like water, Gatorade and Propel Water. You may also have black coffee and tea (no cream or milk).    Nothing by mouth within 2 hours of surgery. This includes gum, candy and breath mints.    If you drink alcohol: Stop drinking it the night before surgery.    If your care team tells you to take medicine on the morning of surgery, it's okay to take it with a sip of water.  Preventing infection    Shower or bathe the night before and morning of your surgery. Follow the instructions your clinic gave you. (If no instructions, use regular soap.)    Don't shave or clip hair near your surgery site. We'll remove the hair if needed.    Don't smoke or vape the morning of surgery. You may chew nicotine gum up to 2 hours before surgery. A nicotine patch is okay.  ? Note: Some surgeries require you to completely quit smoking and nicotine. Check with your surgeon.    Your care team will make every effort to keep you safe from infection. We will:  ? Clean our hands often with soap and water (or an alcohol-based hand rub).  ? Clean the skin at your surgery site with a special soap that kills germs.  ? Give you a special gown to keep you warm. (Cold raises the risk of infection.)  ? Wear special hair covers, masks, gowns and gloves during surgery.  ? Give antibiotic medicine, if prescribed. Not all surgeries need antibiotics.  What to bring on the day of surgery    Photo ID and insurance card    Copy of your health care directive, if you have one    Glasses and hearing aides (bring cases)  ? You can't wear contacts during surgery    Inhaler and eye drops, if you use them (tell us  about these when you arrive)    CPAP machine or breathing device, if you use them    A few personal items, if spending the night    If you have . . .  ? A pacemaker, ICD (cardiac defibrillator) or other implant: Bring the ID card.  ? An implanted stimulator: Bring the remote control.  ? A legal guardian: Bring a copy of the certified (court-stamped) guardianship papers.  Please remove any jewelry, including body piercings. Leave jewelry and other valuables at home.  If you're going home the day of surgery    You must have a responsible adult drive you home. They should stay with you overnight as well.    If you don't have someone to stay with you, and you aren't safe to go home alone, we may keep you overnight. Insurance often won't pay for this.  After surgery  If it's hard to control your pain or you need more pain medicine, please call your surgeon's office.  Questions?   If you have any questions for your care team, list them here: _________________________________________________________________________________________________________________________________________________________________________ ____________________________________ ____________________________________ ____________________________________  For informational purposes only. Not to replace the advice of your health care provider. Copyright   2003, 2019 [facility]. All rights reserved. Clinically reviewed by Chelsea Reid MD. [employer] [id number] - REV 07/22.

## 2022-10-29 LAB — SARS-COV-2 RNA RESP QL NAA+PROBE: NEGATIVE

## 2022-11-04 ENCOUNTER — TRANSFERRED RECORDS (OUTPATIENT)
Dept: HEALTH INFORMATION MANAGEMENT | Facility: CLINIC | Age: 42
End: 2022-11-04

## 2023-02-12 ENCOUNTER — HEALTH MAINTENANCE LETTER (OUTPATIENT)
Age: 43
End: 2023-02-12

## 2023-02-12 ASSESSMENT — ENCOUNTER SYMPTOMS
JOINT SWELLING: 0
MYALGIAS: 1
DIARRHEA: 0
EYE PAIN: 0
COUGH: 0
NAUSEA: 0
ABDOMINAL PAIN: 0
HEARTBURN: 0
HEMATURIA: 0
CONSTIPATION: 0
SORE THROAT: 0
FEVER: 0
DIZZINESS: 0
WEAKNESS: 0
DYSURIA: 0
PALPITATIONS: 0
PARESTHESIAS: 0
NERVOUS/ANXIOUS: 0
HEADACHES: 0
CHILLS: 0
HEMATOCHEZIA: 0
SHORTNESS OF BREATH: 0
ARTHRALGIAS: 1
FREQUENCY: 0

## 2023-02-16 ENCOUNTER — OFFICE VISIT (OUTPATIENT)
Dept: PEDIATRICS | Facility: CLINIC | Age: 43
End: 2023-02-16
Payer: OTHER GOVERNMENT

## 2023-02-16 VITALS
WEIGHT: 217 LBS | SYSTOLIC BLOOD PRESSURE: 124 MMHG | BODY MASS INDEX: 31.07 KG/M2 | HEART RATE: 83 BPM | HEIGHT: 70 IN | TEMPERATURE: 98.7 F | RESPIRATION RATE: 14 BRPM | OXYGEN SATURATION: 97 % | DIASTOLIC BLOOD PRESSURE: 68 MMHG

## 2023-02-16 DIAGNOSIS — Z12.11 SCREEN FOR COLON CANCER: ICD-10-CM

## 2023-02-16 DIAGNOSIS — Z23 NEED FOR TDAP VACCINATION: ICD-10-CM

## 2023-02-16 DIAGNOSIS — Z00.00 ROUTINE GENERAL MEDICAL EXAMINATION AT A HEALTH CARE FACILITY: Primary | ICD-10-CM

## 2023-02-16 PROCEDURE — 90471 IMMUNIZATION ADMIN: CPT | Performed by: STUDENT IN AN ORGANIZED HEALTH CARE EDUCATION/TRAINING PROGRAM

## 2023-02-16 PROCEDURE — 99396 PREV VISIT EST AGE 40-64: CPT | Mod: 25 | Performed by: STUDENT IN AN ORGANIZED HEALTH CARE EDUCATION/TRAINING PROGRAM

## 2023-02-16 PROCEDURE — 90715 TDAP VACCINE 7 YRS/> IM: CPT | Performed by: STUDENT IN AN ORGANIZED HEALTH CARE EDUCATION/TRAINING PROGRAM

## 2023-02-16 ASSESSMENT — ENCOUNTER SYMPTOMS
FREQUENCY: 0
DIZZINESS: 0
COUGH: 0
SORE THROAT: 0
ABDOMINAL PAIN: 0
PARESTHESIAS: 0
NAUSEA: 0
PALPITATIONS: 0
ARTHRALGIAS: 1
DYSURIA: 0
HEMATOCHEZIA: 0
CHILLS: 0
DIARRHEA: 0
CONSTIPATION: 0
HEADACHES: 0
WEAKNESS: 0
MYALGIAS: 1
HEMATURIA: 0
NERVOUS/ANXIOUS: 0
HEARTBURN: 0
EYE PAIN: 0
FEVER: 0
JOINT SWELLING: 0
SHORTNESS OF BREATH: 0

## 2023-02-16 NOTE — PROGRESS NOTES
SUBJECTIVE:   CC: Deandre is an 43 year old who presents for preventative health visit.     Patient has been advised of split billing requirements and indicates understanding: Yes     Healthy Habits:     Getting at least 3 servings of Calcium per day:  NO    Bi-annual eye exam:  NO    Dental care twice a year:  NO    Sleep apnea or symptoms of sleep apnea:  None    Diet:  Regular (no restrictions), Carbohydrate counting, Gluten-free/reduced and Other    Frequency of exercise:  4-5 days/week    Duration of exercise:  45-60 minutes    Taking medications regularly:  Yes    Medication side effects:  None    PHQ-2 Total Score: 0    Additional concerns today:  No      Seeing physical therapy for mobility, stiffness    Took PPI for 2 weeks then has been good     Esophageal mass found to be leiomyoma on endoscopic ultrasound and biopsy     Today's PHQ-2 Score:   PHQ-2 (  [employer]) 2023   Q1: Little interest or pleasure in doing things 0   Q2: Feeling down, depressed or hopeless 0   PHQ-2 Score 0   Q1: Little interest or pleasure in doing things Not at all   Q2: Feeling down, depressed or hopeless Not at all   PHQ-2 Score 0     Social History     Tobacco Use     Smoking status: Former     Packs/day: 1.00     Years: 10.00     Pack years: 10.00     Types: Cigarettes, Dip, chew, snus or snuff     Start date: 1997     Quit date: 2011     Years since quittin.1     Smokeless tobacco: Former     Quit date: 2021     Tobacco comments:     Former user; no interest in using again   Substance Use Topics     Alcohol use: Not Currently     Comment: Quit approx 2022; no desire to continue       Alcohol Use 2023   Prescreen: >3 drinks/day or >7 drinks/week? No     Last PSA: No results found for: PSA    Reviewed orders with patient. Reviewed health maintenance and updated orders accordingly - Yes    Reviewed and updated as needed this visit by clinical staff   Tobacco  Allergies  Medsis Rico  "Marsha on 2/16/2023 at 10:29 AM    Reviewed and updated as needed this visit by Provider                   Review of Systems   Constitutional: Negative for chills and fever.   HENT: Negative for congestion, ear pain, hearing loss and sore throat.    Eyes: Negative for pain and visual disturbance.   Respiratory: Negative for cough and shortness of breath.    Cardiovascular: Negative for chest pain, palpitations and peripheral edema.   Gastrointestinal: Negative for abdominal pain, constipation, diarrhea, heartburn, hematochezia and nausea.   Genitourinary: Negative for dysuria, frequency, genital sores, hematuria, impotence, penile discharge and urgency.   Musculoskeletal: Positive for arthralgias and myalgias. Negative for joint swelling.   Skin: Negative for rash.   Neurological: Negative for dizziness, weakness, headaches and paresthesias.   Psychiatric/Behavioral: Negative for mood changes. The patient is not nervous/anxious.      OBJECTIVE:   /68 (BP Location: Right arm, Patient Position: Sitting, Cuff Size: Adult Regular)   Pulse 83   Temp 98.7  F (37.1  C) (Temporal)   Resp 14   Ht 1.778 m (5' 10\")   Wt 98.4 kg (217 lb)   SpO2 97%   BMI 31.14 kg/m      Physical Exam  GENERAL: healthy, alert and no distress  EYES: Eyes grossly normal to inspection, and conjunctivae and sclerae normal  HENT: ear canals and TM's normal  NECK: no adenopathy, no asymmetry, masses, or scars and thyroid normal to palpation  RESP: lungs clear to auscultation - no rales, rhonchi or wheezes  CV: regular rate and rhythm, normal S1 S2, no S3 or S4, no murmur, click or rub, no peripheral edema and peripheral pulses strong  ABDOMEN: soft, nontender, no hepatosplenomegaly, no masses  MS: no gross musculoskeletal defects noted, no edema  SKIN: no suspicious lesions or rashes  NEURO: Normal strength and tone, mentation intact and speech normal  PSYCH: mentation appears normal, affect normal/bright      ASSESSMENT/PLAN:       " ICD-10-CM    1. Routine general medical examination at a health care facility  Z00.00       2. Screen for colon cancer  Z12.11             COUNSELING:   Reviewed preventive health counseling, as reflected in patient instructions        He reports that he quit smoking about 11 years ago. His smoking use included cigarettes and dip, chew, snus or snuff. He started smoking about 25 years ago. He has a 10.00 pack-year smoking history. He quit smokeless tobacco use about 13 months ago.            Meche Mcgee MD  [facility]

## 2023-08-16 ENCOUNTER — LAB (OUTPATIENT)
Dept: PEDIATRICS | Facility: CLINIC | Age: 43
End: 2023-08-16
Payer: OTHER GOVERNMENT

## 2023-08-16 DIAGNOSIS — Z12.11 SCREEN FOR COLON CANCER: ICD-10-CM

## 2024-03-07 SDOH — HEALTH STABILITY: PHYSICAL HEALTH: ON AVERAGE, HOW MANY DAYS PER WEEK DO YOU ENGAGE IN MODERATE TO STRENUOUS EXERCISE (LIKE A BRISK WALK)?: 4 DAYS

## 2024-03-07 SDOH — HEALTH STABILITY: PHYSICAL HEALTH: ON AVERAGE, HOW MANY MINUTES DO YOU ENGAGE IN EXERCISE AT THIS LEVEL?: 90 MIN

## 2024-03-07 ASSESSMENT — SOCIAL DETERMINANTS OF HEALTH (SDOH): HOW OFTEN DO YOU GET TOGETHER WITH FRIENDS OR RELATIVES?: ONCE A WEEK

## 2024-03-14 ENCOUNTER — OFFICE VISIT (OUTPATIENT)
Dept: PEDIATRICS | Facility: CLINIC | Age: 44
End: 2024-03-14
Payer: OTHER GOVERNMENT

## 2024-03-14 VITALS
HEART RATE: 84 BPM | BODY MASS INDEX: 30.64 KG/M2 | DIASTOLIC BLOOD PRESSURE: 60 MMHG | TEMPERATURE: 97.2 F | WEIGHT: 214 LBS | HEIGHT: 70 IN | OXYGEN SATURATION: 97 % | SYSTOLIC BLOOD PRESSURE: 110 MMHG

## 2024-03-14 DIAGNOSIS — Z12.11 SCREEN FOR COLON CANCER: ICD-10-CM

## 2024-03-14 DIAGNOSIS — Z00.00 ROUTINE GENERAL MEDICAL EXAMINATION AT A HEALTH CARE FACILITY: Primary | ICD-10-CM

## 2024-03-14 PROCEDURE — 99396 PREV VISIT EST AGE 40-64: CPT | Performed by: STUDENT IN AN ORGANIZED HEALTH CARE EDUCATION/TRAINING PROGRAM

## 2024-03-14 NOTE — PROGRESS NOTES
"Preventive Care Visit  [facility] EARL Mcgee MD, Internal Medicine - Pediatrics  Mar 14, 2024      Assessment & Plan     Routine general medical examination at a health care facility  Labs next physical.    Screen for colon cancer  - Colonoscopy Screening  Referral; Future              BMI  Estimated body mass index is 30.71 kg/m  as calculated from the following:    Height as of this encounter: 1.778 m (5' 10\").    Weight as of this encounter: 97.1 kg (214 lb).       Counseling  Appropriate preventive services were discussed with this patient, including applicable screening as appropriate for fall prevention, nutrition, physical activity, Tobacco-use cessation, weight loss and cognition.  Checklist reviewing preventive services available has been given to the patient.  Reviewed patient's diet, addressing concerns and/or questions.   The patient was instructed to see the dentist every 6 months.   He is at risk for psychosocial distress and has been provided with information to reduce risk.           Mickey Garza is a 44 year old, presenting for the following:  Physical           HPI  Working with  on mobility and strength        3/7/2024   General Health   How would you rate your overall physical health? Excellent   Feel stress (tense, anxious, or unable to sleep) Only a little   (!) STRESS CONCERN      3/7/2024   Nutrition   Three or more servings of calcium each day? Yes   Diet: Gluten-free/reduced   How many servings of fruit and vegetables per day? (!) 2-3   How many sweetened beverages each day? 0-1         3/7/2024   Exercise   Days per week of moderate/strenous exercise 4 days   Average minutes spent exercising at this level 90 min         3/7/2024   Social Factors   Frequency of gathering with friends or relatives Once a week   Worry food won't last until get money to buy more No   Food not last or not have enough money for food? No   Do you have housing?  Yes "   Are you worried about losing your housing? No   Lack of transportation? No   Unable to get utilities (heat,electricity)? No         3/7/2024   Dental   Dentist two times every year? (!) NO         3/7/2024   TB Screening   Were you born outside of [place]?  No         Today's PHQ-2 Score:       3/14/2024     9:42 AM   PHQ-2 (  [employer])   Q1: Little interest or pleasure in doing things 0   Q2: Feeling down, depressed or hopeless 1   PHQ-2 Score 1   Q1: Little interest or pleasure in doing things Not at all   Q2: Feeling down, depressed or hopeless Several days   PHQ-2 Score 1           3/7/2024   Substance Use   Alcohol more than 3/day or more than 7/wk Not Applicable   Do you use any other substances recreationally? No     Social History     Tobacco Use    Smoking status: Former     Packs/day: 1.00     Years: 10.00     Additional pack years: 0.00     Total pack years: 10.00     Types: Cigarettes, Dip, chew, snus or snuff     Start date: 1997     Quit date: 2011     Years since quittin.2    Smokeless tobacco: Former     Quit date: 2021    Tobacco comments:     Former user; no interest in using again   Vaping Use    Vaping Use: Never used   Substance Use Topics    Alcohol use: Not Currently     Comment: Quit approx 2022; no desire to continue    Drug use: Never           3/7/2024   STI Screening   New sexual partner(s) since last STI/HIV test? No   ASCVD Risk   The 10-year ASCVD risk score (Ceferino CORDON, et al., 2019) is: 1.7%    Values used to calculate the score:      Age: 44 years      Sex: Male      Is Non- : No      Diabetic: No      Tobacco smoker: No      Systolic Blood Pressure: 110 mmHg      Is BP treated: No      HDL Cholesterol: 44 mg/dL      Total Cholesterol: 181 mg/dL       Reviewed and updated as needed this visit by Provider                             Objective    Exam  /60 (BP Location: Right arm, Patient Position: Sitting, Cuff Size: Adult  "Large)   Pulse 84   Temp 97.2  F (36.2  C) (Tympanic)   Ht 1.778 m (5' 10\")   Wt 97.1 kg (214 lb)   SpO2 97%   BMI 30.71 kg/m     Estimated body mass index is 30.71 kg/m  as calculated from the following:    Height as of this encounter: 1.778 m (5' 10\").    Weight as of this encounter: 97.1 kg (214 lb).    Physical Exam  GENERAL: alert and no distress  EYES: Eyes grossly normal to inspection, and conjunctivae and sclerae normal  HENT: ear canals and TM's normal, nose and mouth without ulcers or lesions  NECK: no adenopathy, no asymmetry, masses, or scars  RESP: lungs clear to auscultation - no rales, rhonchi or wheezes  CV: regular rate and rhythm, normal S1 S2, no S3 or S4, no murmur, click or rub, no peripheral edema  ABDOMEN: soft, nontender, no hepatosplenomegaly, no masses  MS: no gross musculoskeletal defects noted, no edema  SKIN: no suspicious lesions or rashes  NEURO: Normal strength and tone, mentation intact and speech normal  PSYCH: mentation appears normal, affect normal/bright      Signed Electronically by: Meche Mcgee MD    "

## 2024-03-14 NOTE — PATIENT INSTRUCTIONS
Preventive Care Advice   This is general advice given by our system to help you stay healthy. However, your care team may have specific advice just for you. Please talk to your care team about your preventive care needs.  Nutrition  Eat 5 or more servings of fruits and vegetables each day.  Try wheat bread, brown rice and whole grain pasta (instead of white bread, rice, and pasta).  Get enough calcium and vitamin D. Check the label on foods and aim for 100% of the RDA (recommended daily allowance).  Lifestyle  Exercise at least 150 minutes each week   (30 minutes a day, 5 days a week).  Do muscle strengthening activities 2 days a week. These help control your weight and prevent disease.  No smoking.  Wear sunscreen to prevent skin cancer.  Have a dental exam and cleaning every 6 months.  Yearly exams  See your health care team every year to talk about:  Any changes in your health.  Any medicines your care team has prescribed.  Preventive care, family planning, and ways to prevent chronic diseases.  Shots (vaccines)   HPV shots (up to age 26), if you've never had them before.  Hepatitis B shots (up to age 59), if you've never had them before.  COVID-19 shot: Get this shot when it's due.  Flu shot: Get a flu shot every year.  Tetanus shot: Get a tetanus shot every 10 years.  Pneumococcal, hepatitis A, and RSV shots: Ask your care team if you need these based on your risk.  Shingles shot (for age 50 and up).  General health tests  Diabetes screening:  Starting at age 35, Get screened for diabetes at least every 3 years.  If you are younger than age 35, ask your care team if you should be screened for diabetes.  Cholesterol test: At age 39, start having a cholesterol test every 5 years, or more often if advised.  Bone density scan (DEXA): At age 50, ask your care team if you should have this scan for osteoporosis (brittle bones).  Hepatitis C: Get tested at least once in your life.  STIs (sexually transmitted  infections)  Before age 24: Ask your care team if you should be screened for STIs.  After age 24: Get screened for STIs if you're at risk. You are at risk for STIs (including HIV) if:  You are sexually active with more than one person.  You don't use condoms every time.  You or a partner was diagnosed with a sexually transmitted infection.  If you are at risk for HIV, ask about PrEP medicine to prevent HIV.  Get tested for HIV at least once in your life, whether you are at risk for HIV or not.  Cancer screening tests  Cervical cancer screening: If you have a cervix, begin getting regular cervical cancer screening tests at age 21. Most people who have regular screenings with normal results can stop after age 65. Talk about this with your provider.  Breast cancer scan (mammogram): If you've ever had breasts, begin having regular mammograms starting at age 40. This is a scan to check for breast cancer.  Colon cancer screening: It is important to start screening for colon cancer at age 45.  Have a colonoscopy test every 10 years (or more often if you're at risk) Or, ask your provider about stool tests like a FIT test every year or Cologuard test every 3 years.  To learn more about your testing options, visit: https://www.[employer]/200313.pdf.  For help making a decision, visit: https://bit.ly/md34941.  Prostate cancer screening test: If you have a prostate and are age 55 to 69, ask your provider if you would benefit from a yearly prostate cancer screening test.  Lung cancer screening: If you are a current or former smoker age 50 to 80, ask your care team if ongoing lung cancer screenings are right for you.  For informational purposes only. Not to replace the advice of your health care provider. Copyright   2023 [employer]. All rights reserved. Clinically reviewed by the [facility]. [employer] [id number] - REV 01/24.    Learning About Stress  What is stress?     Stress is your  body's response to a hard situation. Your body can have a physical, emotional, or mental response. Stress is a fact of life for most people, and it affects everyone differently. What causes stress for you may not be stressful for someone else.  A lot of things can cause stress. You may feel stress when you go on a job interview, take a test, or run a race. This kind of short-term stress is normal and even useful. It can help you if you need to work hard or react quickly. For example, stress can help you finish an important job on time.  Long-term stress is caused by ongoing stressful situations or events. Examples of long-term stress include long-term health problems, ongoing problems at work, or conflicts in your family. Long-term stress can harm your health.  How does stress affect your health?  When you are stressed, your body responds as though you are in danger. It makes hormones that speed up your heart, make you breathe faster, and give you a burst of energy. This is called the fight-or-flight stress response. If the stress is over quickly, your body goes back to normal and no harm is done.  But if stress happens too often or lasts too long, it can have bad effects. Long-term stress can make you more likely to get sick, and it can make symptoms of some diseases worse. If you tense up when you are stressed, you may develop neck, shoulder, or low back pain. Stress is linked to high blood pressure and heart disease.  Stress also harms your emotional health. It can make you pta, tense, or depressed. Your relationships may suffer, and you may not do well at work or school.  What can you do to manage stress?  You can try these things to help manage stress:   Do something active. Exercise or activity can help reduce stress. Walking is a great way to get started. Even everyday activities such as housecleaning or yard work can help.  Try yoga or candis chi. These techniques combine exercise and meditation. You may need  some training at first to learn them.  Do something you enjoy. For example, listen to music or go to a movie. Practice your hobby or do volunteer work.  Meditate. This can help you relax, because you are not worrying about what happened before or what may happen in the future.  Do guided imagery. Imagine yourself in any setting that helps you feel calm. You can use online videos, books, or a teacher to guide you.  Do breathing exercises. For example:  From a standing position, bend forward from the waist with your knees slightly bent. Let your arms dangle close to the floor.  Breathe in slowly and deeply as you return to a standing position. Roll up slowly and lift your head last.  Hold your breath for just a few seconds in the standing position.  Breathe out slowly and bend forward from the waist.  Let your feelings out. Talk, laugh, cry, and express anger when you need to. Talking with supportive friends or family, a counselor, or a courtney leader about your feelings is a healthy way to relieve stress. Avoid discussing your feelings with people who make you feel worse.  Write. It may help to write about things that are bothering you. This helps you find out how much stress you feel and what is causing it. When you know this, you can find better ways to cope.  What can you do to prevent stress?  You might try some of these things to help prevent stress:  Manage your time. This helps you find time to do the things you want and need to do.  Get enough sleep. Your body recovers from the stresses of the day while you are sleeping.  Get support. Your family, friends, and community can make a difference in how you experience stress.  Limit your news feed. Avoid or limit time on social media or news that may make you feel stressed.  Do something active. Exercise or activity can help reduce stress. Walking is a great way to get started.  Where can you learn more?  Go to https://www.healthwise.net/patiented  Enter N032 in the  "search box to learn more about \"Learning About Stress.\"  Current as of: October 24, 2023               Content Version: 14.0    1820-8787 Twice.   Care instructions adapted under license by your healthcare professional. If you have questions about a medical condition or this instruction, always ask your healthcare professional. Healthwise, [employer] disclaims any warranty or liability for your use of this information.      Eating Healthy Foods: Care Instructions  With every meal, you can make healthy food choices. Try to eat a variety of fruits, vegetables, whole grains, lean proteins, and low-fat dairy products. This can help you get the right balance of nutrients, including vitamins and minerals. Small changes add up over time. You can start by adding one healthy food to your meals each day.    Try to make half your plate fruits and vegetables, one-fourth whole grains, and one-fourth lean proteins. Try including dairy with your meals.   Eat more fruits and vegetables. Try to have them with most meals and snacks.   Foods for healthy eating    Fruits    These can be fresh, frozen, canned, or dried.  Try to choose whole fruit rather than fruit juice.  Eat a variety of colors.    Vegetables    These can be fresh, frozen, canned, or dried.  Beans, peas, and lentils count too.    Whole grains    Choose whole-grain breads, cereals, and noodles.  Try brown rice.    Lean proteins    These can include lean meat, poultry, fish, and eggs.  You can also have tofu, beans, peas, lentils, nuts, and seeds.    Dairy    Try milk, yogurt, and cheese.  Choose low-fat or fat-free when you can.  If you need to, use lactose-free milk or fortified plant-based milk products, such as soy milk.    Water    Drink water when you're thirsty.  Limit sugar-sweetened drinks, including soda, fruit drinks, and sports drinks.  Where can you learn more?  Go to https://www.[employer].net/patiented        "

## 2024-10-14 ENCOUNTER — OFFICE VISIT (OUTPATIENT)
Dept: PEDIATRICS | Facility: CLINIC | Age: 44
End: 2024-10-14
Payer: OTHER GOVERNMENT

## 2024-10-14 VITALS
BODY MASS INDEX: 31.88 KG/M2 | HEART RATE: 79 BPM | WEIGHT: 222.7 LBS | SYSTOLIC BLOOD PRESSURE: 129 MMHG | DIASTOLIC BLOOD PRESSURE: 84 MMHG | RESPIRATION RATE: 16 BRPM | HEIGHT: 70 IN | OXYGEN SATURATION: 96 %

## 2024-10-14 DIAGNOSIS — Z12.5 SCREENING FOR PROSTATE CANCER: ICD-10-CM

## 2024-10-14 DIAGNOSIS — N52.9 ERECTILE DYSFUNCTION, UNSPECIFIED ERECTILE DYSFUNCTION TYPE: Primary | ICD-10-CM

## 2024-10-14 DIAGNOSIS — Z12.11 SCREEN FOR COLON CANCER: ICD-10-CM

## 2024-10-14 LAB
ALBUMIN SERPL BCG-MCNC: 4.6 G/DL (ref 3.5–5.2)
ALP SERPL-CCNC: 70 U/L (ref 40–150)
ALT SERPL W P-5'-P-CCNC: 34 U/L (ref 0–70)
ANION GAP SERPL CALCULATED.3IONS-SCNC: 8 MMOL/L (ref 7–15)
AST SERPL W P-5'-P-CCNC: 27 U/L (ref 0–45)
BILIRUB SERPL-MCNC: 0.4 MG/DL
BUN SERPL-MCNC: 19.3 MG/DL (ref 6–20)
CALCIUM SERPL-MCNC: 9.7 MG/DL (ref 8.8–10.4)
CHLORIDE SERPL-SCNC: 102 MMOL/L (ref 98–107)
CHOLEST SERPL-MCNC: 225 MG/DL
CREAT SERPL-MCNC: 1.07 MG/DL (ref 0.67–1.17)
EGFRCR SERPLBLD CKD-EPI 2021: 87 ML/MIN/1.73M2
ERYTHROCYTE [DISTWIDTH] IN BLOOD BY AUTOMATED COUNT: 12.3 % (ref 10–15)
EST. AVERAGE GLUCOSE BLD GHB EST-MCNC: 126 MG/DL
FASTING STATUS PATIENT QL REPORTED: ABNORMAL
FASTING STATUS PATIENT QL REPORTED: NORMAL
GLUCOSE SERPL-MCNC: 99 MG/DL (ref 70–99)
HBA1C MFR BLD: 6 % (ref 0–5.6)
HCO3 SERPL-SCNC: 26 MMOL/L (ref 22–29)
HCT VFR BLD AUTO: 50 % (ref 40–53)
HDLC SERPL-MCNC: 51 MG/DL
HGB BLD-MCNC: 16.6 G/DL (ref 13.3–17.7)
LDLC SERPL CALC-MCNC: 150 MG/DL
MCH RBC QN AUTO: 29.6 PG (ref 26.5–33)
MCHC RBC AUTO-ENTMCNC: 33.2 G/DL (ref 31.5–36.5)
MCV RBC AUTO: 89 FL (ref 78–100)
NONHDLC SERPL-MCNC: 174 MG/DL
PLATELET # BLD AUTO: 242 10E3/UL (ref 150–450)
POTASSIUM SERPL-SCNC: 4.4 MMOL/L (ref 3.4–5.3)
PROT SERPL-MCNC: 7.7 G/DL (ref 6.4–8.3)
PSA SERPL DL<=0.01 NG/ML-MCNC: 2.23 NG/ML (ref 0–2.5)
RBC # BLD AUTO: 5.61 10E6/UL (ref 4.4–5.9)
SODIUM SERPL-SCNC: 136 MMOL/L (ref 135–145)
TRIGL SERPL-MCNC: 118 MG/DL
TSH SERPL DL<=0.005 MIU/L-ACNC: 0.68 UIU/ML (ref 0.3–4.2)
WBC # BLD AUTO: 8.8 10E3/UL (ref 4–11)

## 2024-10-14 PROCEDURE — 80061 LIPID PANEL: CPT | Performed by: STUDENT IN AN ORGANIZED HEALTH CARE EDUCATION/TRAINING PROGRAM

## 2024-10-14 PROCEDURE — 83036 HEMOGLOBIN GLYCOSYLATED A1C: CPT | Performed by: STUDENT IN AN ORGANIZED HEALTH CARE EDUCATION/TRAINING PROGRAM

## 2024-10-14 PROCEDURE — 36415 COLL VENOUS BLD VENIPUNCTURE: CPT | Performed by: STUDENT IN AN ORGANIZED HEALTH CARE EDUCATION/TRAINING PROGRAM

## 2024-10-14 PROCEDURE — 84403 ASSAY OF TOTAL TESTOSTERONE: CPT | Performed by: STUDENT IN AN ORGANIZED HEALTH CARE EDUCATION/TRAINING PROGRAM

## 2024-10-14 PROCEDURE — 84443 ASSAY THYROID STIM HORMONE: CPT | Performed by: STUDENT IN AN ORGANIZED HEALTH CARE EDUCATION/TRAINING PROGRAM

## 2024-10-14 PROCEDURE — 99214 OFFICE O/P EST MOD 30 MIN: CPT | Performed by: STUDENT IN AN ORGANIZED HEALTH CARE EDUCATION/TRAINING PROGRAM

## 2024-10-14 PROCEDURE — G0103 PSA SCREENING: HCPCS | Performed by: STUDENT IN AN ORGANIZED HEALTH CARE EDUCATION/TRAINING PROGRAM

## 2024-10-14 PROCEDURE — 85027 COMPLETE CBC AUTOMATED: CPT | Performed by: STUDENT IN AN ORGANIZED HEALTH CARE EDUCATION/TRAINING PROGRAM

## 2024-10-14 PROCEDURE — G2211 COMPLEX E/M VISIT ADD ON: HCPCS | Performed by: STUDENT IN AN ORGANIZED HEALTH CARE EDUCATION/TRAINING PROGRAM

## 2024-10-14 PROCEDURE — 80053 COMPREHEN METABOLIC PANEL: CPT | Performed by: STUDENT IN AN ORGANIZED HEALTH CARE EDUCATION/TRAINING PROGRAM

## 2024-10-14 ASSESSMENT — PAIN SCALES - GENERAL: PAINLEVEL: NO PAIN (1)

## 2024-10-14 NOTE — PROGRESS NOTES
"  Assessment & Plan     Erectile dysfunction, unspecified erectile dysfunction type  Will check baseline labs. If estimated ASCVD risk elevated obtain CAC scan. Consider tadalafil if labs normal. If testosterone low recheck with morning lab and consider prolactin.   - Lipid panel reflex to direct LDL Fasting; Future  - Comprehensive metabolic panel (BMP + Alb, Alk Phos, ALT, AST, Total. Bili, TP); Future  - Hemoglobin A1c; Future  - TSH with free T4 reflex; Future  - Testosterone, total; Future  - CBC with platelets; Future  - Lipid panel reflex to direct LDL Fasting  - Comprehensive metabolic panel (BMP + Alb, Alk Phos, ALT, AST, Total. Bili, TP)  - Hemoglobin A1c  - TSH with free T4 reflex  - Testosterone, total  - CBC with platelets    Screen for colon cancer  - Colonoscopy Screening  Referral; Future    Screening for prostate cancer  - PSA, screen; Future  - PSA, screen    The longitudinal plan of care for the diagnosis(es)/condition(s) as documented were addressed during this visit. Due to the added complexity in care, I will continue to support Greg in the subsequent management and with ongoing continuity of care.      BMI  Estimated body mass index is 31.95 kg/m  as calculated from the following:    Height as of this encounter: 1.778 m (5' 10\").    Weight as of this encounter: 101 kg (222 lb 11.2 oz).             Mickey Garza is a 44 year old, presenting for the following health issues:  Sexual Problem        10/14/2024     9:50 AM   Additional Questions   Roomed by Acacia Summers   Accompanied by N/A     History of Present Illness       Reason for visit:  Personal Health   He is taking medications regularly.   Having hard time achieving and maintaining erection  Still getting morning erections  Has had COVID19 a few times  Otherwise no changes in health, stress, work, home  Vitamins: multivitamin, ashweganda  Getting 200g of protein  Exercising regularly  No smoking, no alcohol use, no other " "substance use  No penile discharge, testicular lumps, rash                    Objective    /84 (BP Location: Right arm, Patient Position: Sitting, Cuff Size: Adult Large)   Pulse 79   Resp 16   Ht 1.778 m (5' 10\")   Wt 101 kg (222 lb 11.2 oz)   SpO2 96%   BMI 31.95 kg/m    Body mass index is 31.95 kg/m .  Physical Exam   GEN: Alert and appropriately interactive for age  EYES: Eyes grossly normal to inspection, conjunctivae and sclerae normal  RESP: Breathing comfortably on room air   CV: Warm and well perfused peripheral extremities   MS: no gross musculoskeletal defects noted, no edema  NEURO: Alert and oriented. Gait normal. Speech fluent.    PSYCH: Affect normal/bright. Appearance well groomed.               Signed Electronically by: Deirdre E. Milligan, MD    " Suturegard Intro: Intraoperative tissue expansion was performed, utilizing the SUTUREGARD device, in order to reduce wound tension.

## 2024-10-14 NOTE — PATIENT INSTRUCTIONS
Todd Delarosa Same Day or Next Day Provider:  -OLIVIA Clark  -Send MyChart message or call clinic  -Her appointments are often not visible on MyChart

## 2024-10-16 LAB — TESTOST SERPL-MCNC: 638 NG/DL (ref 240–950)

## 2025-03-23 ENCOUNTER — APPOINTMENT (OUTPATIENT)
Dept: CT IMAGING | Facility: CLINIC | Age: 45
End: 2025-03-23
Attending: EMERGENCY MEDICINE
Payer: COMMERCIAL

## 2025-03-23 ENCOUNTER — HOSPITAL ENCOUNTER (INPATIENT)
Facility: CLINIC | Age: 45
LOS: 4 days | Discharge: HOME | End: 2025-03-27
Attending: EMERGENCY MEDICINE | Admitting: INTERNAL MEDICINE
Payer: COMMERCIAL

## 2025-03-23 DIAGNOSIS — R10.13 EPIGASTRIC PAIN: ICD-10-CM

## 2025-03-23 DIAGNOSIS — K85.90 ACUTE PANCREATITIS, UNSPECIFIED COMPLICATION STATUS, UNSPECIFIED PANCREATITIS TYPE: ICD-10-CM

## 2025-03-23 LAB
ALBUMIN SERPL BCG-MCNC: 4.4 G/DL (ref 3.5–5.2)
ALP SERPL-CCNC: 77 U/L (ref 40–150)
ALT SERPL W P-5'-P-CCNC: 29 U/L (ref 0–70)
ANION GAP SERPL CALCULATED.3IONS-SCNC: 9 MMOL/L (ref 7–15)
AST SERPL W P-5'-P-CCNC: 20 U/L (ref 0–45)
BASOPHILS # BLD AUTO: 0 10E3/UL (ref 0–0.2)
BASOPHILS NFR BLD AUTO: 0 %
BILIRUB SERPL-MCNC: 0.4 MG/DL
BUN SERPL-MCNC: 12.7 MG/DL (ref 6–20)
CALCIUM SERPL-MCNC: 9.6 MG/DL (ref 8.8–10.4)
CHLORIDE SERPL-SCNC: 100 MMOL/L (ref 98–107)
CREAT SERPL-MCNC: 1.15 MG/DL (ref 0.67–1.17)
EGFRCR SERPLBLD CKD-EPI 2021: 79 ML/MIN/1.73M2
EOSINOPHIL # BLD AUTO: 0.1 10E3/UL (ref 0–0.7)
EOSINOPHIL NFR BLD AUTO: 1 %
ERYTHROCYTE [DISTWIDTH] IN BLOOD BY AUTOMATED COUNT: 12.6 % (ref 10–15)
GLUCOSE SERPL-MCNC: 114 MG/DL (ref 70–99)
HCO3 SERPL-SCNC: 27 MMOL/L (ref 22–29)
HCT VFR BLD AUTO: 44.5 % (ref 40–53)
HGB BLD-MCNC: 15.4 G/DL (ref 13.3–17.7)
HOLD SPECIMEN: NORMAL
HOLD SPECIMEN: NORMAL
IMM GRANULOCYTES # BLD: 0 10E3/UL
IMM GRANULOCYTES NFR BLD: 0 %
LIPASE SERPL-CCNC: 666 U/L (ref 13–60)
LYMPHOCYTES # BLD AUTO: 1.9 10E3/UL (ref 0.8–5.3)
LYMPHOCYTES NFR BLD AUTO: 16 %
MCH RBC QN AUTO: 29.3 PG (ref 26.5–33)
MCHC RBC AUTO-ENTMCNC: 34.6 G/DL (ref 31.5–36.5)
MCV RBC AUTO: 85 FL (ref 78–100)
MONOCYTES # BLD AUTO: 1.5 10E3/UL (ref 0–1.3)
MONOCYTES NFR BLD AUTO: 12 %
NEUTROPHILS # BLD AUTO: 8.6 10E3/UL (ref 1.6–8.3)
NEUTROPHILS NFR BLD AUTO: 71 %
NRBC # BLD AUTO: 0 10E3/UL
NRBC BLD AUTO-RTO: 0 /100
PLATELET # BLD AUTO: 197 10E3/UL (ref 150–450)
POTASSIUM SERPL-SCNC: 3.9 MMOL/L (ref 3.4–5.3)
PROT SERPL-MCNC: 7.1 G/DL (ref 6.4–8.3)
RBC # BLD AUTO: 5.25 10E6/UL (ref 4.4–5.9)
SODIUM SERPL-SCNC: 136 MMOL/L (ref 135–145)
WBC # BLD AUTO: 12.1 10E3/UL (ref 4–11)

## 2025-03-23 PROCEDURE — 74177 CT ABD & PELVIS W/CONTRAST: CPT

## 2025-03-23 PROCEDURE — 96375 TX/PRO/DX INJ NEW DRUG ADDON: CPT

## 2025-03-23 PROCEDURE — 250N000011 HC RX IP 250 OP 636: Performed by: EMERGENCY MEDICINE

## 2025-03-23 PROCEDURE — 96374 THER/PROPH/DIAG INJ IV PUSH: CPT

## 2025-03-23 PROCEDURE — 99285 EMERGENCY DEPT VISIT HI MDM: CPT | Mod: 25

## 2025-03-23 PROCEDURE — 250N000009 HC RX 250: Performed by: EMERGENCY MEDICINE

## 2025-03-23 PROCEDURE — 85025 COMPLETE CBC W/AUTO DIFF WBC: CPT | Performed by: EMERGENCY MEDICINE

## 2025-03-23 PROCEDURE — 250N000011 HC RX IP 250 OP 636: Mod: JZ | Performed by: EMERGENCY MEDICINE

## 2025-03-23 PROCEDURE — 83690 ASSAY OF LIPASE: CPT | Performed by: EMERGENCY MEDICINE

## 2025-03-23 PROCEDURE — 80053 COMPREHEN METABOLIC PANEL: CPT | Performed by: EMERGENCY MEDICINE

## 2025-03-23 PROCEDURE — 36415 COLL VENOUS BLD VENIPUNCTURE: CPT | Performed by: EMERGENCY MEDICINE

## 2025-03-23 PROCEDURE — 120N000004 HC R&B MS OVERFLOW

## 2025-03-23 RX ORDER — HYDROMORPHONE HYDROCHLORIDE 1 MG/ML
0.5 INJECTION, SOLUTION INTRAMUSCULAR; INTRAVENOUS; SUBCUTANEOUS EVERY 30 MIN PRN
Status: DISCONTINUED | OUTPATIENT
Start: 2025-03-23 | End: 2025-03-24

## 2025-03-23 RX ORDER — ONDANSETRON 2 MG/ML
4 INJECTION INTRAMUSCULAR; INTRAVENOUS EVERY 30 MIN PRN
Status: DISCONTINUED | OUTPATIENT
Start: 2025-03-23 | End: 2025-03-24

## 2025-03-23 RX ORDER — IOPAMIDOL 755 MG/ML
120 INJECTION, SOLUTION INTRAVASCULAR ONCE
Status: COMPLETED | OUTPATIENT
Start: 2025-03-23 | End: 2025-03-23

## 2025-03-23 RX ADMIN — ONDANSETRON 4 MG: 2 INJECTION, SOLUTION INTRAMUSCULAR; INTRAVENOUS at 21:36

## 2025-03-23 RX ADMIN — HYDROMORPHONE HYDROCHLORIDE 0.5 MG: 1 INJECTION, SOLUTION INTRAMUSCULAR; INTRAVENOUS; SUBCUTANEOUS at 23:59

## 2025-03-23 RX ADMIN — HYDROMORPHONE HYDROCHLORIDE 0.5 MG: 1 INJECTION, SOLUTION INTRAMUSCULAR; INTRAVENOUS; SUBCUTANEOUS at 21:36

## 2025-03-23 RX ADMIN — SODIUM CHLORIDE 65 ML: 9 INJECTION, SOLUTION INTRAVENOUS at 21:23

## 2025-03-23 RX ADMIN — IOPAMIDOL 100 ML: 755 INJECTION, SOLUTION INTRAVENOUS at 21:22

## 2025-03-23 ASSESSMENT — ACTIVITIES OF DAILY LIVING (ADL)
ADLS_ACUITY_SCORE: 41

## 2025-03-23 ASSESSMENT — COLUMBIA-SUICIDE SEVERITY RATING SCALE - C-SSRS
2. HAVE YOU ACTUALLY HAD ANY THOUGHTS OF KILLING YOURSELF IN THE PAST MONTH?: NO
6. HAVE YOU EVER DONE ANYTHING, STARTED TO DO ANYTHING, OR PREPARED TO DO ANYTHING TO END YOUR LIFE?: NO
1. IN THE PAST MONTH, HAVE YOU WISHED YOU WERE DEAD OR WISHED YOU COULD GO TO SLEEP AND NOT WAKE UP?: NO

## 2025-03-24 LAB
ALBUMIN SERPL BCG-MCNC: 3.8 G/DL (ref 3.5–5.2)
ALP SERPL-CCNC: 69 U/L (ref 40–150)
ALT SERPL W P-5'-P-CCNC: 25 U/L (ref 0–70)
ANION GAP SERPL CALCULATED.3IONS-SCNC: 10 MMOL/L (ref 7–15)
AST SERPL W P-5'-P-CCNC: 17 U/L (ref 0–45)
BILIRUB SERPL-MCNC: 0.5 MG/DL
BUN SERPL-MCNC: 14.8 MG/DL (ref 6–20)
CALCIUM SERPL-MCNC: 9 MG/DL (ref 8.8–10.4)
CHLORIDE SERPL-SCNC: 104 MMOL/L (ref 98–107)
CREAT SERPL-MCNC: 1.01 MG/DL (ref 0.67–1.17)
EGFRCR SERPLBLD CKD-EPI 2021: >90 ML/MIN/1.73M2
ERYTHROCYTE [DISTWIDTH] IN BLOOD BY AUTOMATED COUNT: 12.6 % (ref 10–15)
GLUCOSE SERPL-MCNC: 127 MG/DL (ref 70–99)
HCO3 SERPL-SCNC: 24 MMOL/L (ref 22–29)
HCT VFR BLD AUTO: 43.7 % (ref 40–53)
HGB BLD-MCNC: 15.2 G/DL (ref 13.3–17.7)
LIPASE SERPL-CCNC: 266 U/L (ref 13–60)
MCH RBC QN AUTO: 29.8 PG (ref 26.5–33)
MCHC RBC AUTO-ENTMCNC: 34.8 G/DL (ref 31.5–36.5)
MCV RBC AUTO: 86 FL (ref 78–100)
PLATELET # BLD AUTO: 166 10E3/UL (ref 150–450)
POTASSIUM SERPL-SCNC: 4.3 MMOL/L (ref 3.4–5.3)
PROT SERPL-MCNC: 6.7 G/DL (ref 6.4–8.3)
RBC # BLD AUTO: 5.1 10E6/UL (ref 4.4–5.9)
SODIUM SERPL-SCNC: 138 MMOL/L (ref 135–145)
WBC # BLD AUTO: 13.7 10E3/UL (ref 4–11)

## 2025-03-24 PROCEDURE — 120N000004 HC R&B MS OVERFLOW

## 2025-03-24 PROCEDURE — 250N000011 HC RX IP 250 OP 636: Mod: JZ | Performed by: INTERNAL MEDICINE

## 2025-03-24 PROCEDURE — 250N000013 HC RX MED GY IP 250 OP 250 PS 637: Performed by: INTERNAL MEDICINE

## 2025-03-24 PROCEDURE — 83690 ASSAY OF LIPASE: CPT | Performed by: INTERNAL MEDICINE

## 2025-03-24 PROCEDURE — 36415 COLL VENOUS BLD VENIPUNCTURE: CPT | Performed by: INTERNAL MEDICINE

## 2025-03-24 PROCEDURE — 99222 1ST HOSP IP/OBS MODERATE 55: CPT | Performed by: INTERNAL MEDICINE

## 2025-03-24 PROCEDURE — 80053 COMPREHEN METABOLIC PANEL: CPT | Performed by: INTERNAL MEDICINE

## 2025-03-24 PROCEDURE — 258N000003 HC RX IP 258 OP 636: Performed by: INTERNAL MEDICINE

## 2025-03-24 PROCEDURE — 85027 COMPLETE CBC AUTOMATED: CPT | Performed by: INTERNAL MEDICINE

## 2025-03-24 RX ORDER — AMOXICILLIN 250 MG
2 CAPSULE ORAL 2 TIMES DAILY PRN
Status: DISCONTINUED | OUTPATIENT
Start: 2025-03-24 | End: 2025-03-27 | Stop reason: HOSPADM

## 2025-03-24 RX ORDER — ONDANSETRON 2 MG/ML
4 INJECTION INTRAMUSCULAR; INTRAVENOUS EVERY 6 HOURS PRN
Status: DISCONTINUED | OUTPATIENT
Start: 2025-03-24 | End: 2025-03-27 | Stop reason: HOSPADM

## 2025-03-24 RX ORDER — POLYETHYLENE GLYCOL 3350 17 G/17G
17 POWDER, FOR SOLUTION ORAL DAILY
Status: DISCONTINUED | OUTPATIENT
Start: 2025-03-24 | End: 2025-03-27 | Stop reason: HOSPADM

## 2025-03-24 RX ORDER — CALCIUM CARBONATE 500 MG/1
1000 TABLET, CHEWABLE ORAL 2 TIMES DAILY PRN
Status: DISCONTINUED | OUTPATIENT
Start: 2025-03-24 | End: 2025-03-27 | Stop reason: HOSPADM

## 2025-03-24 RX ORDER — ACETAMINOPHEN 325 MG/1
650 TABLET ORAL EVERY 4 HOURS PRN
Status: DISCONTINUED | OUTPATIENT
Start: 2025-03-24 | End: 2025-03-27 | Stop reason: HOSPADM

## 2025-03-24 RX ORDER — HYDROMORPHONE HCL IN WATER/PF 6 MG/30 ML
0.2 PATIENT CONTROLLED ANALGESIA SYRINGE INTRAVENOUS EVERY 4 HOURS PRN
Status: DISCONTINUED | OUTPATIENT
Start: 2025-03-24 | End: 2025-03-27

## 2025-03-24 RX ORDER — PROCHLORPERAZINE MALEATE 5 MG/1
10 TABLET ORAL EVERY 6 HOURS PRN
Status: DISCONTINUED | OUTPATIENT
Start: 2025-03-24 | End: 2025-03-27 | Stop reason: HOSPADM

## 2025-03-24 RX ORDER — AMOXICILLIN 250 MG
1 CAPSULE ORAL 2 TIMES DAILY PRN
Status: DISCONTINUED | OUTPATIENT
Start: 2025-03-24 | End: 2025-03-27 | Stop reason: HOSPADM

## 2025-03-24 RX ORDER — NALOXONE HYDROCHLORIDE 0.4 MG/ML
0.4 INJECTION, SOLUTION INTRAMUSCULAR; INTRAVENOUS; SUBCUTANEOUS
Status: DISCONTINUED | OUTPATIENT
Start: 2025-03-24 | End: 2025-03-27 | Stop reason: HOSPADM

## 2025-03-24 RX ORDER — ONDANSETRON 4 MG/1
4 TABLET, ORALLY DISINTEGRATING ORAL EVERY 6 HOURS PRN
Status: DISCONTINUED | OUTPATIENT
Start: 2025-03-24 | End: 2025-03-27 | Stop reason: HOSPADM

## 2025-03-24 RX ORDER — KETOROLAC TROMETHAMINE 15 MG/ML
15 INJECTION, SOLUTION INTRAMUSCULAR; INTRAVENOUS EVERY 6 HOURS PRN
Status: DISPENSED | OUTPATIENT
Start: 2025-03-24 | End: 2025-03-26

## 2025-03-24 RX ORDER — NALOXONE HYDROCHLORIDE 0.4 MG/ML
0.2 INJECTION, SOLUTION INTRAMUSCULAR; INTRAVENOUS; SUBCUTANEOUS
Status: DISCONTINUED | OUTPATIENT
Start: 2025-03-24 | End: 2025-03-27 | Stop reason: HOSPADM

## 2025-03-24 RX ORDER — ACETAMINOPHEN 650 MG/1
650 SUPPOSITORY RECTAL EVERY 4 HOURS PRN
Status: DISCONTINUED | OUTPATIENT
Start: 2025-03-24 | End: 2025-03-27 | Stop reason: HOSPADM

## 2025-03-24 RX ORDER — HYDROMORPHONE HCL IN WATER/PF 6 MG/30 ML
0.4 PATIENT CONTROLLED ANALGESIA SYRINGE INTRAVENOUS EVERY 4 HOURS PRN
Status: DISCONTINUED | OUTPATIENT
Start: 2025-03-24 | End: 2025-03-27

## 2025-03-24 RX ORDER — MICONAZOLE NITRATE 2 G/100G
CREAM TOPICAL 2 TIMES DAILY PRN
Status: DISCONTINUED | OUTPATIENT
Start: 2025-03-24 | End: 2025-03-27 | Stop reason: HOSPADM

## 2025-03-24 RX ORDER — SODIUM CHLORIDE 9 MG/ML
INJECTION, SOLUTION INTRAVENOUS CONTINUOUS
Status: ACTIVE | OUTPATIENT
Start: 2025-03-24 | End: 2025-03-24

## 2025-03-24 RX ORDER — LIDOCAINE 40 MG/G
CREAM TOPICAL
Status: DISCONTINUED | OUTPATIENT
Start: 2025-03-24 | End: 2025-03-27 | Stop reason: HOSPADM

## 2025-03-24 RX ORDER — OXYCODONE HYDROCHLORIDE 5 MG/1
5 TABLET ORAL EVERY 4 HOURS PRN
Status: DISCONTINUED | OUTPATIENT
Start: 2025-03-24 | End: 2025-03-26

## 2025-03-24 RX ADMIN — ONDANSETRON 4 MG: 2 INJECTION, SOLUTION INTRAMUSCULAR; INTRAVENOUS at 01:54

## 2025-03-24 RX ADMIN — KETOROLAC TROMETHAMINE 15 MG: 15 INJECTION, SOLUTION INTRAMUSCULAR; INTRAVENOUS at 15:41

## 2025-03-24 RX ADMIN — OXYCODONE HYDROCHLORIDE 2.5 MG: 5 TABLET ORAL at 01:54

## 2025-03-24 RX ADMIN — HYDROMORPHONE HYDROCHLORIDE 0.2 MG: 0.2 INJECTION, SOLUTION INTRAMUSCULAR; INTRAVENOUS; SUBCUTANEOUS at 05:28

## 2025-03-24 RX ADMIN — HYDROMORPHONE HYDROCHLORIDE 0.4 MG: 0.2 INJECTION, SOLUTION INTRAMUSCULAR; INTRAVENOUS; SUBCUTANEOUS at 09:46

## 2025-03-24 RX ADMIN — KETOROLAC TROMETHAMINE 15 MG: 15 INJECTION, SOLUTION INTRAMUSCULAR; INTRAVENOUS at 07:28

## 2025-03-24 RX ADMIN — KETOROLAC TROMETHAMINE 15 MG: 15 INJECTION, SOLUTION INTRAMUSCULAR; INTRAVENOUS at 21:23

## 2025-03-24 RX ADMIN — HYDROMORPHONE HYDROCHLORIDE 0.4 MG: 0.2 INJECTION, SOLUTION INTRAMUSCULAR; INTRAVENOUS; SUBCUTANEOUS at 13:41

## 2025-03-24 RX ADMIN — SODIUM CHLORIDE, PRESERVATIVE FREE: 5 INJECTION INTRAVENOUS at 00:44

## 2025-03-24 RX ADMIN — ACETAMINOPHEN 650 MG: 325 TABLET, FILM COATED ORAL at 21:35

## 2025-03-24 RX ADMIN — SODIUM CHLORIDE, PRESERVATIVE FREE: 5 INJECTION INTRAVENOUS at 09:52

## 2025-03-24 RX ADMIN — ONDANSETRON 4 MG: 2 INJECTION, SOLUTION INTRAMUSCULAR; INTRAVENOUS at 09:52

## 2025-03-24 RX ADMIN — KETOROLAC TROMETHAMINE 15 MG: 15 INJECTION, SOLUTION INTRAMUSCULAR; INTRAVENOUS at 00:42

## 2025-03-24 RX ADMIN — HYDROMORPHONE HYDROCHLORIDE 0.4 MG: 0.2 INJECTION, SOLUTION INTRAMUSCULAR; INTRAVENOUS; SUBCUTANEOUS at 18:50

## 2025-03-24 RX ADMIN — OXYCODONE HYDROCHLORIDE 5 MG: 5 TABLET ORAL at 11:47

## 2025-03-24 ASSESSMENT — ACTIVITIES OF DAILY LIVING (ADL)
ADLS_ACUITY_SCORE: 15
ADLS_ACUITY_SCORE: 41
ADLS_ACUITY_SCORE: 15

## 2025-03-24 NOTE — ED PROVIDER NOTES
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  No issues and removed all alcohol and diet issues from his life.  Last night tacos with wife pain epigastric by 1100 and bloated and worsening overnight same similar to 2022.  No dyschezia.  No fever CP SOB or other pains.        Independent Historian:    Wife    Review of External Notes:  Findings:       ENDOSCOPIC FINDING: :       The examined esophagus was endoscopically normal, save for LA-A        esophagitis.       The stomach showed a small hiatal hernia, with a submucosal bulge,        nonobstructive, at the cardia.       The examined duodenum was endoscopically normal.       ENDOSONOGRAPHIC FINDING: :       A lobulated intramural (subepithelial) lesion was found in the cardia of        the stomach. The lesion was hypoechoic. Sonographically, the lesion        appeared to originate from the muscularis propria (Layer 4). The lesion        measured 23 mm (in maximum thickness). The lesion also measured 14 mm in        diameter. The outer endosonographic borders were well defined. Fine        needle aspiration for cytology was performed. Color Doppler imaging was        utilized prior to needle puncture to confirm a lack of significant        vascular structures within the needle path. Four passes were made with        the 22 gauge needle using a transgastric approach. A stylet was used. A        cytotechnologist was present to evaluate the adequacy of the specimen.        Final cytology results are pending.       There was no sign of significant endosonographic abnormality in the        ampulla.       There was no sign of significant endosonographic abnormality in the        common bile duct, in the common hepatic duct and in the gallbladder. The        maximum diameter of the ducts were 3 mm.       There was no sign  of significant endosonographic abnormality in the        visualized portion of the liver.       Pancreatic parenchymal abnormalities were noted in the entire pancreas.        These consisted of diffuse echogenicity. The PD was 1 mm at the head,        body, and tail. No divisum.       No lymphadenopathy seen.                                                                                   Impressions/Post-Op Diagnosis:       - Submucosal mass at GEJ (in hiatal hernia), 23 by 14 mm in size.        Appearance most c/w GIST. FNA as above.       - Fatty pancreas.       Discussed with patient his history of alcohol use, and given above        findings have asked him to cease alcohol.    ER 8/2022 idiopathic pancreatitis.   EXAM: MR ABDOMEN MRCP W/O and W CONTRAST  LOCATION: [facility]  DATE/TIME: 10/9/2022 10:07 AM     INDICATION: Recent acute pancreatitis. Evaluate for mass and for structural causes.  COMPARISON: None.  TECHNIQUE: Routine MR liver/pancreas protocol including axial and coronal MRCP sequences. 2D and 3D reconstruction performed by MR technologist including MIP reconstruction and slab cholangiograms. If performed with contrast, additional dynamic T1 post   IV contrast images.  CONTRAST: 9.5 mL Gadavist      FINDINGS:      MRCP: Normal gallbladder. No biliary ductal dilation. No choledocholiths or biliary strictures.     LIVER: Diffuse hepatic steatosis. No focal hepatic lesions.     PANCREAS: Cystic 1.2 x 1.1 x 0.9 cm lesion in the pancreatic head (22/#59, 26/#39). The lesion may have trace nonenhancing debris. There is a thin continuous rim of peripheral enhancement. No clear communication with the main pancreatic duct. There is   mild surrounding restricted diffusion. Remainder of the pancreas is homogenous. No other pancreatic mass. Normal caliber main pancreatic duct, with conventional pancreatic ductal anatomy. No peripancreatic inflammation.     ADDITIONAL FINDINGS:       Spleen, adrenal glands, and kidneys are normal.      Enhancing 1.9 x 2.9 x 2.8 cm likely submucosal distal esophageal mass (3/#22, 22/#31), with mild restricted diffusion.      No dilated bowel loops.      No enlarged lymph node. No ascites.      Patent hepatic, portal, splenic, and superior mesenteric veins. Retroaortic left renal vein. Normal caliber abdominal aorta.      No aggressive bone lesions.                                                                      IMPRESSION:     1.  Indeterminate cystic 1.2 cm lesion within the pancreatic head. Differentials include a small pseudocyst from the recent episode of pancreatitis (favored), a cystic neuroendocrine neoplasm, or less likely a small branch duct IPMN containing internal   debris. Recommend correlation with EUS/FNA.     2.  Conventional pancreatic ductal anatomy. No potential structural cause of pancreatitis identified. No acute pancreatitis. No biliary ductal dilation or choledocholiths.     3.  Likely submucosal distal esophageal mass measuring up to 2.9 cm. An esophageal neoplasm is possible. This could also be assessed and biopsied at the time of endoscopy.     4.  Diffuse hepatic steatosis.  Medications:    No current outpatient medications on file.      Past Medical History:    Past Medical History:   Diagnosis Date    Closed fracture of left hip with routine healing     RBBB (right bundle branch block)        Past Surgical History:    Past Surgical History:   Procedure Laterality Date    BIOPSY  10/31/2023    Biopsy of Esophageal Mass    EYE SURGERY  04/2007    PRK    VASECTOMY      WISDOM TOOTH EXTRACTION            Physical Exam   Patient Vitals for the past 24 hrs:   BP Temp Temp src Pulse Resp SpO2 Weight   03/23/25 2301 128/76 -- -- 82 -- 98 % --   03/23/25 2246 124/79 -- -- 75 -- 96 % --   03/23/25 2231 (!) 152/91 -- -- 79 -- 96 % --   03/23/25 2215 (!) 156/95 -- -- 82 -- 96 % --   03/23/25 2012 (!) 171/96 97.5  F (36.4  C) Temporal 72 18  100 % 102.7 kg (226 lb 6.6 oz)        Physical Exam  General: Patient is well appearing. No distress.  Head: Atraumatic.  Eyes: Conjunctivae and EOM are normal. No scleral icterus.  Neck: Normal range of motion. Neck supple.   Cardiovascular: Normal rate, regular rhythm, normal heart sounds and intact distal pulses.   Pulmonary/Chest: Breath sounds normal. No respiratory distress.  Abdominal: Soft. Bowel sounds are normal. No distension. No tenderness. No rebound or guarding.   Musculoskeletal: Normal range of motion.  Skin: Warm and dry. No rash noted. Not diaphoretic.      Emergency Department Course   ECG    Imaging:  CT Abdomen Pelvis w Contrast   Final Result   IMPRESSION:    1.  Peripancreatic inflammatory stranding about the neck, head and uncinate process of the pancreas, compatible with acute interstitial pancreatitis. No formed peripancreatic fluid collection or other complication. Prominent peripancreatic lymph nodes,    likely reactive.   2.  Diffuse hepatic steatosis.   3.  Nonobstructing calyceal calculi of the right greater than left kidneys the largest of which at the right midpole measures 8 mm. No ureterolithiasis or hydronephrosis.   4.  Probable small hiatal hernia. Mild apparent thickening of gastroesophageal junction at the hiatal hernia. This may just represent decompressed bowel however esophagogastritis or a recurrent mass in this region are not excluded. Consider followup with    GI for possible endoscopy.          Laboratory:  Labs Ordered and Resulted from Time of ED Arrival to Time of ED Departure   COMPREHENSIVE METABOLIC PANEL - Abnormal       Result Value    Sodium 136      Potassium 3.9      Carbon Dioxide (CO2) 27      Anion Gap 9      Urea Nitrogen 12.7      Creatinine 1.15      GFR Estimate 79      Calcium 9.6      Chloride 100      Glucose 114 (*)     Alkaline Phosphatase 77      AST 20      ALT 29      Protein Total 7.1      Albumin 4.4      Bilirubin Total 0.4     LIPASE -  Abnormal    Lipase 666 (*)    CBC WITH PLATELETS AND DIFFERENTIAL - Abnormal    WBC Count 12.1 (*)     RBC Count 5.25      Hemoglobin 15.4      Hematocrit 44.5      MCV 85      MCH 29.3      MCHC 34.6      RDW 12.6      Platelet Count 197      % Neutrophils 71      % Lymphocytes 16      % Monocytes 12      % Eosinophils 1      % Basophils 0      % Immature Granulocytes 0      NRBCs per 100 WBC 0      Absolute Neutrophils 8.6 (*)     Absolute Lymphocytes 1.9      Absolute Monocytes 1.5 (*)     Absolute Eosinophils 0.1      Absolute Basophils 0.0      Absolute Immature Granulocytes 0.0      Absolute NRBCs 0.0          Procedures       Emergency Department Course & Assessments:    Interventions:  Medications   ondansetron (ZOFRAN) injection 4 mg (4 mg Intravenous $Given 3/23/25 2136)   HYDROmorphone (PF) (DILAUDID) injection 0.5 mg (0.5 mg Intravenous $Given 3/23/25 2136)   iopamidol (ISOVUE-370) solution 120 mL (100 mLs Intravenous $Given 3/23/25 2122)   Saline CT scan flush (65 mLs Intravenous $Given 3/23/25 2123)        Assessments:      Independent Interpretation (X-rays, CTs, rhythm strip):  CT abd peripancreatic inflammation head.  No free fluid or air.  No large stones or obstructions.      Consultations/Discussion of Management or Tests:  Hospitalist       Social Drivers of Health affecting care:       Disposition:  Admit    Impression & Plan           Medical Decision Making:  Pt has no alcohol nor stones and again idiopathic interstitial pancreatitis with previous workup in 2022 with endo US and MR no definitive cause.  Will admit for pain control NPO and possibly another GI consult in the am.    Diagnosis:    ICD-10-CM    1. Epigastric pain  R10.13       2. Acute pancreatitis, unspecified complication status, unspecified pancreatitis type  K85.90            Discharge Medications:  New Prescriptions    No medications on file            3/23/2025   Donald Stein MD Stevens, Andrew C,  MD  03/23/25 1164

## 2025-03-24 NOTE — PLAN OF CARE
"  Activity: SBA  Diet/BS Checks: NPO  IV Access/Drains: PIV infusing NS @125  Pain Management: prn oxy, dilaudid, Toradol,   Abnormal VS/Results: VSS on RA tmax 100.5, refused tylenol  Consults: GI  D/C Disposition: pending   Other Info:   -pt using tpump      Problem: Adult Inpatient Plan of Care  Goal: Plan of Care Review  Description: The Plan of Care Review/Shift note should be completed every shift.  The Outcome Evaluation is a brief statement about your assessment that the patient is improving, declining, or no change.  This information will be displayed automatically on your shiftnote.  Outcome: Not Progressing  Goal: Patient-Specific Goal (Individualized)  Description: You can add care plan individualizations to a care plan. Examples of Individualization might be:  \"Parent requests to be called daily at 9am for status\", \"I have a hard time hearing out of my right ear\", or \"Do not touch me to wake me up as it startlesme\".  Outcome: Not Progressing  Goal: Absence of Hospital-Acquired Illness or Injury  Outcome: Not Progressing  Intervention: Identify and Manage Fall Risk  Recent Flowsheet Documentation  Taken 3/24/2025 0733 by Vangie Ambrocio RN  Safety Promotion/Fall Prevention: safety round/check completed  Intervention: Prevent Skin Injury  Recent Flowsheet Documentation  Taken 3/24/2025 0733 by Vangie Ambrocio RN  Body Position: position changed independently  Goal: Optimal Comfort and Wellbeing  Outcome: Not Progressing  Goal: Readiness for Transition of Care  Outcome: Not Progressing     Problem: Pancreatitis  Goal: Fluid and Electrolyte Balance  Outcome: Not Progressing  Goal: Absence of Infection Signs and Symptoms  Outcome: Not Progressing  Goal: Optimal Nutrition Delivery  Outcome: Not Progressing  Goal: Optimal Pain Control and Function  Outcome: Not Progressing  Goal: Effective Oxygenation and Ventilation  Outcome: Not Progressing  Intervention: Optimize Oxygenation and Ventilation  Recent " Flowsheet Documentation  Taken 3/24/2025 0733 by Vangie Ambrocio, RN  Activity Management: activity adjusted per tolerance  Head of Bed (HOB) Positioning: HOB at 30-45 degrees     Problem: Pain Acute  Goal: Optimal Pain Control and Function  Outcome: Not Progressing  Intervention: Prevent or Manage Pain  Recent Flowsheet Documentation  Taken 3/24/2025 0733 by Vagnie Ambrocio, RN  Medication Review/Management: medications reviewed

## 2025-03-24 NOTE — H&P
History and Physical     Deandre Lemus MRN# [id number]   YOB: 1980 Age: 45 year old      Date of Admission:  3/23/2025    Primary care provider: Milligan, Deirdre E          Assessment and Plan:     Summary of Stay: Deandre Lemus is a 45 year old male with a history of an episode of acute pancreatitis in 9/2022 deemed idiopathic admitted on 3/23/2025 with recurrent episode of pancreatitis     First episode of pancreatitis 8/2022.  Deemed idiopathic.  No e/o significant alcohol use, no biliary issue identified.  Post pancreatitis underwent EUS and noted to have mass at GE junction ->EGD with bx consistent with gastric leiomyoma.  MRCP negative as well.  He has not had any alcohol since that time.  Per [facility] note in follow-up 11/2022, felt could just be an isolated episode but if it recurs would recommend w/up for genetic causes     He comes into the ER with sx similar to his last episode.  Epigastric pain radiating to his back with associated nausea but no vomiting.  Sx were fairly mild prior to dinner last night but afterwards the pain and nausea was significantly worse. There was some radiation to the back. No fevers or chills.  Decided to come in as he felt that this was another episode of pancreatitis     Problem List:   Recurrent acute pancreatitis   Previously felt to be idiopathic.  He has not had any alcohol since his initial dx in 2022. Denies any trauma.  No medications   NPO overnight in case studies are needed  IVF, anti emetics, pain control   [facility] consult for further w/up    Hx of gastric leiomyoma     DVT Prophylaxis: Pneumatic Compression Devices  Code Status: Full Code  Functional Status: independent  Wang: not needed  Access:   PIV            Time spent 45 minutes reviewing epic including notes/labs/prior hx, current medications.  In addition to interviewing and examining the patient, updated patient and family regarding plan of care          Chief Complaint:     Abdominal  pain with nausea        History of Present Illness:   Deandre Lemus is a 45 year old male with a history of an episode of acute pancreatitis in 9/2022 deemed idiopathic admitted on 3/23/2025 with recurrent episode of pancreatitis     First episode of pancreatitis 8/2022.  Deemed idiopathic.  No e/o significant alcohol use, no biliary issue identified.  Post pancreatitis underwent EUS and noted to have mass at GE junction ->EGD with bx consistent with gastric leiomyoma.  MRCP negative as well.  He has not had any alcohol since that time.  Per [facility] note in follow-up 11/2022, felt could just be an isolated episode but if it recurs would recommend w/up for genetic causes     He comes into the ER with sx similar to his last episode.  Epigastric pain radiating to his back with associated nausea but no vomiting.  Sx were fairly mild prior to dinner last night but afterwards the pain and nausea was significantly worse. No fevers or chills.  Decided to come in as he felt that this was another episode of pancreatitis       The history is obtained in discussion with the ER provider  Donald Stein MD and the patient with excellent reliability      Epic and Care everywhere were extensively reviewed        Past Medical History:     Past Medical History:   Diagnosis Date    Closed fracture of left hip with routine healing     2007    Gastric leiomyoma     Idiopathic acute pancreatitis 2022    RBBB (right bundle branch block)              Past Surgical History:     Past Surgical History:   Procedure Laterality Date    BIOPSY  10/31/2023    Biopsy of Esophageal Mass    EYE SURGERY  04/2007    PRK    VASECTOMY      WISDOM TOOTH EXTRACTION               Social History:     Social History     Tobacco Use    Smoking status: Former     Current packs/day: 0.00     Average packs/day: 1 pack/day for 14.4 years (14.4 ttl pk-yrs)     Types: Cigarettes, Dip, chew, snus or snuff     Start date: 8/1/1997     Quit date: 12/31/2011     Years  since quittin.2    Smokeless tobacco: Former     Quit date: 2021    Tobacco comments:     Former user; no interest in using again   Substance Use Topics    Alcohol use: Not Currently     Comment: Quit approx 2022; no desire to continue             Family History:   I have reviewed this patient's family history         Allergies:   No Known Allergies          Medications:     None           Review of Systems:     A Comprehensive greater than 10 system review of systems was carried out.  Pertinent positives and negatives are noted above.  Otherwise negative for contributory information.           Physical Exam:   Blood pressure 128/76, pulse 82, temperature 97.5  F (36.4  C), temperature source Temporal, resp. rate 18, weight 102.7 kg (226 lb 6.6 oz), SpO2 98%.  Exam:    General:  Pleasant nad looks stated age  HEENT:  Head nc/at sclera clear PERNeck is supple  Lungs: cta b nl effort   CV:  RRR no m/r/g no le edema  Abd:  slightly firm, some ttp in epigastric region   Neuro:  Cn 2-12 grossly intact and  zhong   Alert and oriented affect appropriate   Skin:  W/d no c/c               Data:     Results for orders placed or performed during the hospital encounter of 25   CT Abdomen Pelvis w Contrast     Status: None    Narrative    EXAM: CT ABDOMEN PELVIS W CONTRAST  LOCATION: [facility]  DATE: 3/23/2025    INDICATION: hx of idiopathic pancreatitis small cyst and endoscopy GIST at GE junction.  Return of similar pain today from   COMPARISON: MRI abdomen MRCP 10/09/2022  TECHNIQUE: CT scan of the abdomen and pelvis was performed following injection of IV contrast. Multiplanar reformats were obtained. Dose reduction techniques were used.  CONTRAST: 100mL Isovue 370    FINDINGS:   LOWER CHEST: Normal.    HEPATOBILIARY: Diffuse hepatic steatosis. Unremarkable gallbladder.    PANCREAS: Peripancreatic inflammatory stranding about the neck head and uncinate process of the pancreas,  compatible with acute interstitial pancreatitis. There are prominent peripancreatic lymph nodes, presumably reactive. No formed peripancreatic fluid   collection or other complication.    SPLEEN: Normal.    ADRENAL GLANDS: Normal.    KIDNEYS/BLADDER: Nonobstructing calyceal calculi of the right greater than left kidneys the largest of which at the right midpole measures 8 mm. No ureterolithiasis or hydronephrosis. Urinary bladder is mildly thick-walled, most likely on the basis of   chronic outlet obstruction.    BOWEL: Probable small hiatal hernia. Mild apparent thickening of gastroesophageal junction at the hiatal hernia. This may just represent decompressed bowel however esophagogastritis or a recurrent mass in this region are not excluded. Colonic   diverticulosis. Normal appendix.    LYMPH NODES: As above. Otherwise no lymphadenopathy.    VASCULATURE: Portal and splenic veins are patent. No evidence of arterial pseudoaneurysm. No AAA. Retroaortic left renal vein.    PELVIC ORGANS: Prostatomegaly.    MUSCULOSKELETAL: Tiny fat-containing left inguinal hernia. Multilevel degenerative changes of the thoracic and lumbosacral spine. No acute osseous abnormality.      Impression    IMPRESSION:   1.  Peripancreatic inflammatory stranding about the neck, head and uncinate process of the pancreas, compatible with acute interstitial pancreatitis. No formed peripancreatic fluid collection or other complication. Prominent peripancreatic lymph nodes,   likely reactive.  2.  Diffuse hepatic steatosis.  3.  Nonobstructing calyceal calculi of the right greater than left kidneys the largest of which at the right midpole measures 8 mm. No ureterolithiasis or hydronephrosis.  4.  Probable small hiatal hernia. Mild apparent thickening of gastroesophageal junction at the hiatal hernia. This may just represent decompressed bowel however esophagogastritis or a recurrent mass in this region are not excluded. Consider followup with   GI  for possible endoscopy.   Comprehensive metabolic panel     Status: Abnormal   Result Value Ref Range    Sodium 136 135 - 145 mmol/L    Potassium 3.9 3.4 - 5.3 mmol/L    Carbon Dioxide (CO2) 27 22 - 29 mmol/L    Anion Gap 9 7 - 15 mmol/L    Urea Nitrogen 12.7 6.0 - 20.0 mg/dL    Creatinine 1.15 0.67 - 1.17 mg/dL    GFR Estimate 79 >60 mL/min/1.73m2    Calcium 9.6 8.8 - 10.4 mg/dL    Chloride 100 98 - 107 mmol/L    Glucose 114 (H) 70 - 99 mg/dL    Alkaline Phosphatase 77 40 - 150 U/L    AST 20 0 - 45 U/L    ALT 29 0 - 70 U/L    Protein Total 7.1 6.4 - 8.3 g/dL    Albumin 4.4 3.5 - 5.2 g/dL    Bilirubin Total 0.4 <=1.2 mg/dL   Lipase     Status: Abnormal   Result Value Ref Range    Lipase 666 (H) 13 - 60 U/L   [place] Draw     Status: None    Narrative    The following orders were created for panel order [place] Draw.  Procedure                               Abnormality         Status                     ---------                               -----------         ------                     Extra Blue Top Tube[[id number]]                             Final result               Extra Red Top Tube[[id number]]                              Final result                 Please view results for these tests on the individual orders.   Extra Blue Top Tube     Status: None   Result Value Ref Range    Hold Specimen JIC    Extra Red Top Tube     Status: None   Result Value Ref Range    Hold Specimen JIC    CBC with platelets and differential     Status: Abnormal   Result Value Ref Range    WBC Count 12.1 (H) 4.0 - 11.0 10e3/uL    RBC Count 5.25 4.40 - 5.90 10e6/uL    Hemoglobin 15.4 13.3 - 17.7 g/dL    Hematocrit 44.5 40.0 - 53.0 %    MCV 85 78 - 100 fL    MCH 29.3 26.5 - 33.0 pg    MCHC 34.6 31.5 - 36.5 g/dL    RDW 12.6 10.0 - 15.0 %    Platelet Count 197 150 - 450 10e3/uL    % Neutrophils 71 %    % Lymphocytes 16 %    % Monocytes 12 %    % Eosinophils 1 %    % Basophils 0 %    % Immature Granulocytes 0 %    NRBCs per 100 WBC 0 <1  /100    Absolute Neutrophils 8.6 (H) 1.6 - 8.3 10e3/uL    Absolute Lymphocytes 1.9 0.8 - 5.3 10e3/uL    Absolute Monocytes 1.5 (H) 0.0 - 1.3 10e3/uL    Absolute Eosinophils 0.1 0.0 - 0.7 10e3/uL    Absolute Basophils 0.0 0.0 - 0.2 10e3/uL    Absolute Immature Granulocytes 0.0 <=0.4 10e3/uL    Absolute NRBCs 0.0 10e3/uL   CBC with platelets     Status: Abnormal   Result Value Ref Range    WBC Count 13.7 (H) 4.0 - 11.0 10e3/uL    RBC Count 5.10 4.40 - 5.90 10e6/uL    Hemoglobin 15.2 13.3 - 17.7 g/dL    Hematocrit 43.7 40.0 - 53.0 %    MCV 86 78 - 100 fL    MCH 29.8 26.5 - 33.0 pg    MCHC 34.8 31.5 - 36.5 g/dL    RDW 12.6 10.0 - 15.0 %    Platelet Count 166 150 - 450 10e3/uL   CBC with platelets differential     Status: Abnormal    Narrative    The following orders were created for panel order CBC with platelets differential.  Procedure                               Abnormality         Status                     ---------                               -----------         ------                     CBC with platelets and ...[[id number]]  Abnormal            Final result                 Please view results for these tests on the individual orders.

## 2025-03-24 NOTE — PHARMACY-ADMISSION MEDICATION HISTORY
Pharmacist Admission Medication History    Admission medication history is complete. The information provided in this note is only as accurate as the sources available at the time of the update.    Information Source(s): Patient via in-person.    Pertinent Information: none.    Changes made to PTA medication list:  Added: none  Deleted: none  Changed: none    Allergies reviewed with patient and updates made in EHR: yes (nka)    Medication History Completed By: Yanelis Ott RPH 3/24/2025 8:02 AM    No outpatient medications have been marked as taking for the 3/23/25 encounter (Hospital Encounter).

## 2025-03-24 NOTE — ED TRIAGE NOTES
Patient c/o upper abdominal pain that stretches to his back for the past 24 hours. States that the pain is left sided. States he had pancreatitis several years ago and this feels similar. ABCs intact. VSS.

## 2025-03-24 NOTE — PLAN OF CARE
"Assumed care from [id number], AO x4, up independently, SBA w/ pain meds. On RA. NPO since midnight with the exception of meds and ice. C/O epigastric pain, given prn Toradol x 1, prn IV Dilaudid x 1, prn Oxycodone x 1 w/ reports of relief. /hr in PIV. Plan of care ongoing.     Goal Outcome Evaluation:       Plan of Care Reviewed With: patient    Overall Patient Progress: no changeOverall Patient Progress: no change    Outcome Evaluation: AOx4, pain management, NPO since midnight, IVF, to be seen by GI today      Problem: Adult Inpatient Plan of Care  Goal: Plan of Care Review  Description: The Plan of Care Review/Shift note should be completed every shift.  The Outcome Evaluation is a brief statement about your assessment that the patient is improving, declining, or no change.  This information will be displayed automatically on your shiftnote.  Outcome: Progressing  Flowsheets (Taken 3/24/2025 3972)  Outcome Evaluation: AOx4, pain management, NPO since midnight, IVF, to be seen by GI today  Plan of Care Reviewed With: patient  Overall Patient Progress: no change  Goal: Patient-Specific Goal (Individualized)  Description: You can add care plan individualizations to a care plan. Examples of Individualization might be:  \"Parent requests to be called daily at 9am for status\", \"I have a hard time hearing out of my right ear\", or \"Do not touch me to wake me up as it startlesme\".  Outcome: Progressing  Goal: Absence of Hospital-Acquired Illness or Injury  Outcome: Progressing  Intervention: Identify and Manage Fall Risk  Recent Flowsheet Documentation  Taken 3/24/2025 0534 by Jerri Lee  Safety Promotion/Fall Prevention: safety round/check completed  Taken 3/24/2025 0307 by Jerri Lee  Safety Promotion/Fall Prevention: safety round/check completed  Taken 3/24/2025 0100 by Jerri Lee  Safety Promotion/Fall Prevention:   lighting adjusted   nonskid shoes/slippers when out of bed   patient and family " education   room near nurse's station   clutter free environment maintained  Intervention: Prevent Skin Injury  Recent Flowsheet Documentation  Taken 3/24/2025 0100 by Jerri Lee  Body Position: position changed independently  Skin Protection: adhesive use limited  Goal: Optimal Comfort and Wellbeing  Outcome: Progressing  Goal: Readiness for Transition of Care  Outcome: Progressing     Problem: Pancreatitis  Goal: Fluid and Electrolyte Balance  Outcome: Progressing  Goal: Absence of Infection Signs and Symptoms  Outcome: Progressing  Goal: Optimal Nutrition Delivery  Outcome: Progressing  Goal: Optimal Pain Control and Function  Outcome: Progressing  Goal: Effective Oxygenation and Ventilation  Outcome: Progressing  Intervention: Optimize Oxygenation and Ventilation  Recent Flowsheet Documentation  Taken 3/24/2025 0100 by Jerri Lee  Activity Management: activity adjusted per tolerance  Head of Bed (HOB) Positioning: HOB at 30-45 degrees     Problem: Pain Acute  Goal: Optimal Pain Control and Function  Outcome: Progressing  Intervention: Prevent or Manage Pain  Recent Flowsheet Documentation  Taken 3/24/2025 0100 by Jerri Lee  Medication Review/Management: medications reviewed

## 2025-03-24 NOTE — CONSULTS
GASTROENTEROLOGY CONSULTATION     Deandre Lemus  [address] [facility] [address]  45 year old male    Admission Date/Time: 3/23/2025  Primary Care Provider: Milligan, Deirdre E    We were asked to see the patient in consultation by Dr. Schofield for evaluation of pancreatitis.        HPI:  Deandre Lemus is a 45 year old male with a history of idiopathic pancreatitis in September 2022 (had been drinking ETOH at that time but in small amounts that it was not felt to be the culprit per his [facility] pancreas doctor), LA grade A erosive eosphagitis, small hiatal hernia, and benign gastric leiomyoma of GE junction  who is admitted with recurrent pancreatitis.  He denies any alcohol use since 2022, does not smoke, has not been started on any new medications.    He follows at [facility] clinic with Dr. Sena, and was last seen 11/4/22 and plan was to pursue genetic workup if recurrent pancreatitis since his sister also has history of pancreatitis albeit that was contributed to gallstones.    Current admission CT scan of the abdomen and pelvis shows normal gallbladder, diffuse hepatic steatosis, peripancreatic inflammatory stranding around the neck, head, and uncinate process of the pancreas.  Small hiatal hernia with mild apparent thickening at the GE junction.  Colonic diverticulosis.    Liver function test normal.  Lipase on admission 666 now 266.      Other results reviewed:  October 31, 2022, endoscopic ultrasound.  This was done for a mass noted in the esophagus on MRI and acute pancreatitis.  There was a subepithelial lesion in the cardia of the stomach and biopsies were consistent with a leiomyoma, negative for malignancy.  He had fatty pancreas, presumed to be from alcohol use.    October 14, 2024, triglycerides normal.      ROS: A comprehensive ten point review of systems was negative aside from those in mentioned in the HPI.      MEDICATIONS:   Current Facility-Administered Medications   Medication Dose Route  Frequency Provider Last Rate Last Admin    acetaminophen (TYLENOL) tablet 650 mg  650 mg Oral Q4H PRN Chrystal Bowser MD        Or    acetaminophen (TYLENOL) Suppository 650 mg  650 mg Rectal Q4H PRN Chrystal Bowser MD        artificial saliva (BIOTENE MT) solution 1 spray  1 spray Mouth/Throat 4x Daily PRN Chrystal Bowser MD        benzocaine-menthol (CHLORASEPTIC) 6-10 MG lozenge 1 lozenge  1 lozenge Buccal Q1H PRN Chrystal Bowser MD        calcium carbonate (TUMS) chewable tablet 1,000 mg  1,000 mg Oral BID PRN Chrystal Bowser MD        HYDROmorphone (DILAUDID) injection 0.2 mg  0.2 mg Intravenous Q4H PRN Chrystal Bowser MD   0.2 mg at 03/24/25 0528    HYDROmorphone (DILAUDID) injection 0.4 mg  0.4 mg Intravenous Q4H PRN Chrystal Bowser MD        ketorolac (TORADOL) injection 15 mg  15 mg Intravenous Q6H PRN Chrystal Bowser MD   15 mg at 03/24/25 0728    lidocaine (LMX4) cream   Topical Q1H PRN Chrystal Bowser MD        lidocaine 1 % 0.1-1 mL  0.1-1 mL Other Q1H PRN Chrystal Bowser MD        menthol-zinc oxide (CALMOSEPTINE) 0.44-20.6 % ointment OINT   Topical 4x Daily PRN Chrystal Bowser MD        miconazole (MICATIN) 2 % cream   Topical BID PRN Chrystal Bowser MD        miconazole (MICATIN) 2 % powder   Topical BID PRN Chrystal Bowser MD        naloxone (NARCAN) injection 0.2 mg  0.2 mg Intravenous Q2 Min PRN Chrystal Bowser MD        Or    naloxone (NARCAN) injection 0.4 mg  0.4 mg Intravenous Q2 Min PRN Chrystal Bowser MD        Or    naloxone (NARCAN) injection 0.2 mg  0.2 mg Intramuscular Q2 Min PRN Chrystal Bowser MD        Or    naloxone (NARCAN) injection 0.4 mg  0.4 mg Intramuscular Q2 Min PRN Chrystal Bowser MD        ondansetron (ZOFRAN ODT) ODT tab 4 mg  4 mg Oral Q6H PRN Chrystal Bowser MD        Or    ondansetron (ZOFRAN) injection 4 mg  4 mg Intravenous Q6H PRN Chrystal Bowser MD   4 mg at 03/24/25 0154    oxyCODONE (ROXICODONE) tablet 5 mg  5 mg Oral Q4H PRN Chrystal Bowser MD        oxyCODONE IR (ROXICODONE)  half-tab 2.5 mg  2.5 mg Oral Q4H PRN Chrystal Bowser MD   2.5 mg at 25 0154    polyethylene glycol (MIRALAX) Packet 17 g  17 g Oral Daily Chrystal Bowser MD        prochlorperazine (COMPAZINE) injection 10 mg  10 mg Intravenous Q6H PRN Chrystal Bowser MD        Or    prochlorperazine (COMPAZINE) tablet 10 mg  10 mg Oral Q6H PRN Chrystal Bowser MD        sendenton-docusate (SENOKOT-S/PERICOLACE) 8.6-50 MG per tablet 1 tablet  1 tablet Oral BID PRN Chrystal Bowser MD        Or    senna-docusate (SENOKOT-S/PERICOLACE) 8.6-50 MG per tablet 2 tablet  2 tablet Oral BID PRN Chrystal Bowser MD        sodium chloride (PF) 0.9% PF flush 3 mL  3 mL Intracatheter Q8H Chrystal Bowser MD   3 mL at 25 0045    sodium chloride (PF) 0.9% PF flush 3 mL  3 mL Intracatheter q1 min prn Chrystal Bowser MD   3 mL at 25 0530    sodium chloride 0.9 % infusion   Intravenous Continuous Chrystal Bowser  mL/hr at 25 0044 New Bag at 25 0044       ALLERGIES: No Known Allergies    Past Medical History:   Diagnosis Date    Closed fracture of left hip with routine healing         Gastric leiomyoma     Idiopathic acute pancreatitis     RBBB (right bundle branch block)        Past Surgical History:   Procedure Laterality Date    BIOPSY  10/31/2023    Biopsy of Esophageal Mass    EYE SURGERY  2007    PRK    VASECTOMY      WISDOM TOOTH EXTRACTION           SOCIAL HISTORY:  Social History     Tobacco Use    Smoking status: Former     Current packs/day: 0.00     Average packs/day: 1 pack/day for 14.4 years (14.4 ttl pk-yrs)     Types: Cigarettes, Dip, chew, snus or snuff     Start date: 1997     Quit date: 2011     Years since quittin.2    Smokeless tobacco: Former     Quit date: 2021    Tobacco comments:     Former user; no interest in using again   Vaping Use    Vaping status: Never Used   Substance Use Topics    Alcohol use: Not Currently     Comment: Quit approx 2022; no desire to continue     "Drug use: Never       FAMILY HISTORY:  Reviewed in his chart.    PHYSICAL EXAM:   /75 (BP Location: Right arm)   Pulse 86   Temp 98.1  F (36.7  C) (Oral)   Resp 18   Ht 1.778 m (5' 10\")   Wt 100.4 kg (221 lb 6.4 oz)   SpO2 95%   BMI 31.77 kg/m      Constitutional: NAD, comfortable  Cardiovascular: RRR  Respiratory: CTAB  Psychiatric: mentation appears normal and affect normal/bright  Head: Normocephalic. Atraumatic.    Neck: Neck supple.   Eyes:  no icterus  ENT: hearing adequate  Abdomen:   Tenderness to palpation, nondistended.  NEURO: grossly negative  SKIN: no suspicious lesions or rashes          ADDITIONAL COMMENTS:   I reviewed the patient's new clinical lab test results.   Recent Labs   Lab Test 03/24/25  0649 03/23/25  2025 10/14/24  1041   WBC 13.7* 12.1* 8.8   HGB 15.2 15.4 16.6   MCV 86 85 89    197 242     Recent Labs   Lab Test 03/24/25  0649 03/23/25  2025 10/14/24  1041    136 136   POTASSIUM 4.3 3.9 4.4   CHLORIDE 104 100 102   CO2 24 27 26   BUN 14.8 12.7 19.3   CR 1.01 1.15 1.07   ANIONGAP 10 9 8   WAQAS 9.0 9.6 9.7   * 114* 99     Recent Labs   Lab Test 03/24/25  0649 03/23/25  2025 10/14/24  1041 08/24/22  2319   ALBUMIN 3.8 4.4 4.6 4.4   BILITOTAL 0.5 0.4 0.4 0.8   ALT 25 29 34 31   AST 17 20 27 28   ALKPHOS 69 77 70 78   LIPASE 266* 666*  --  631*             .    CONSULTATION ASSESSMENT AND PLAN:    Idiopathic pancreatitis, although in 2022 some concern for possibly alcohol contribution, patient has not drank any alcohol since 2022.  Follows at [facility] clinic with Dr. Sena and he recommended genetic workup if recurrent pancreatitis, this work up can be done outpatient.    Continue supportive measures with IV hydration, NPO, pain control.  We will continue to follow.    Erika Mittal MD  [facility]  "

## 2025-03-24 NOTE — PROGRESS NOTES
Deandre Lemus is a 45-year-old male with history of pancreatitis  diagnosed with episode of abdominal pain he had August 2022, and no history of alcohol use since that time, not on any medications, MRCP negative, came to [facility] 3/23/2025 with epigastric abdominal pain radiating into his back associate with nausea.  Lipase was 666 glucose 114 total bilirubin 0.4 AST 20 ALT 29 alkaline phosphatase 77.  WBC 12.1 CT scan of the neck showed peripancreatic inflammatory stranding about the neck, head and uncinate process of the pancreas compatible with acute interstitial pancreatitis without any complications associated with diffuse hepatic steatosis.   Patient was evaluated by gastroenterology who thought that the patient could be evaluated by Dr. Sena with genetic workup for recurrent pancreatitis which can be done as outpatient.  His abdominal pain is 4/10 and he is NPO on IV fluids and pain medications.  I did tell him that we will start him on clear liquid diet in the morning to which he agrees.    No

## 2025-03-24 NOTE — ED NOTES
[facility]  ED Nurse Handoff Report    ED Chief complaint: Abdominal Pain and Back Pain  . ED Diagnosis:   Final diagnoses:   Epigastric pain   Acute pancreatitis, unspecified complication status, unspecified pancreatitis type       Allergies: No Known Allergies    Code Status: Full Code    Activity level - Baseline/Home:  independent.  Activity Level - Current:   independent.   Lift room needed: No.   Bariatric: No   Needed: No   Isolation: No.   Infection: Not Applicable.     Respiratory status: Room air    Vital Signs (within 30 minutes):   Vitals:    03/23/25 2215 03/23/25 2231 03/23/25 2246 03/23/25 2301   BP: (!) 156/95 (!) 152/91 124/79 128/76   Pulse: 82 79 75 82   Resp:       Temp:       TempSrc:       SpO2: 96% 96% 96% 98%   Weight:           Cardiac Rhythm:  ,      Pain level:    Patient confused: No.   Patient Falls Risk: patient and family education.   Elimination Status: Has voided     Patient Report - Initial Complaint:Patient c/o upper abdominal pain that stretches to his back for the past 24 hours. States that the pain is left sided. States he had pancreatitis several years ago and this feels similar. ABCs intact. VSS.  .   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  No issues and removed all alcohol and diet issues from his life.  Last night tacos with wife pain epigastric by 1100 and bloated and worsening overnight same similar to 2022.  No dyschezia.  No fever CP SOB or other pains.    Abnormal Results:   Labs Ordered and Resulted from Time of ED Arrival to Time of ED Departure   COMPREHENSIVE METABOLIC PANEL - Abnormal       Result Value    Sodium 136      Potassium 3.9      Carbon Dioxide (CO2) 27      Anion Gap 9      Urea Nitrogen 12.7      Creatinine 1.15      GFR Estimate 79      Calcium 9.6      Chloride 100      Glucose 114 (*)     Alkaline Phosphatase  77      AST 20      ALT 29      Protein Total 7.1      Albumin 4.4      Bilirubin Total 0.4     LIPASE - Abnormal    Lipase 666 (*)    CBC WITH PLATELETS AND DIFFERENTIAL - Abnormal    WBC Count 12.1 (*)     RBC Count 5.25      Hemoglobin 15.4      Hematocrit 44.5      MCV 85      MCH 29.3      MCHC 34.6      RDW 12.6      Platelet Count 197      % Neutrophils 71      % Lymphocytes 16      % Monocytes 12      % Eosinophils 1      % Basophils 0      % Immature Granulocytes 0      NRBCs per 100 WBC 0      Absolute Neutrophils 8.6 (*)     Absolute Lymphocytes 1.9      Absolute Monocytes 1.5 (*)     Absolute Eosinophils 0.1      Absolute Basophils 0.0      Absolute Immature Granulocytes 0.0      Absolute NRBCs 0.0          CT Abdomen Pelvis w Contrast   Final Result   IMPRESSION:    1.  Peripancreatic inflammatory stranding about the neck, head and uncinate process of the pancreas, compatible with acute interstitial pancreatitis. No formed peripancreatic fluid collection or other complication. Prominent peripancreatic lymph nodes,    likely reactive.   2.  Diffuse hepatic steatosis.   3.  Nonobstructing calyceal calculi of the right greater than left kidneys the largest of which at the right midpole measures 8 mm. No ureterolithiasis or hydronephrosis.   4.  Probable small hiatal hernia. Mild apparent thickening of gastroesophageal junction at the hiatal hernia. This may just represent decompressed bowel however esophagogastritis or a recurrent mass in this region are not excluded. Consider followup with    GI for possible endoscopy.          Treatments provided: labs, pain meds, imaging.  Family Comments: wife at bedside.  OBS brochure/video discussed/provided to patient:  N/A  ED Medications:   Medications   ondansetron (ZOFRAN) injection 4 mg (4 mg Intravenous $Given 3/23/25 2136)   HYDROmorphone (PF) (DILAUDID) injection 0.5 mg (0.5 mg Intravenous $Given 3/23/25 2136)   iopamidol (ISOVUE-370) solution 120 mL (100  mLs Intravenous $Given 3/23/25 2122)   Saline CT scan flush (65 mLs Intravenous $Given 3/23/25 2123)       Drips infusing:  No  For the majority of the shift this patient was Green.   Interventions performed were none.    Sepsis treatment initiated: No    Cares/treatment/interventions/medications to be completed following ED care: per provider orders.    ED Nurse Name: Crissy Mcclelland RN  11:43 PM    RECEIVING UNIT ED HANDOFF REVIEW    Above ED Nurse Handoff Report was reviewed: Yes  Reviewed by: Munira Conteh RN on March 23, 2025 at 11:55 PM   DANE Paul called the ED to inform them the note was read: Yes

## 2025-03-25 LAB
ALBUMIN SERPL BCG-MCNC: 3.6 G/DL (ref 3.5–5.2)
ALP SERPL-CCNC: 66 U/L (ref 40–150)
ALT SERPL W P-5'-P-CCNC: 18 U/L (ref 0–70)
ANION GAP SERPL CALCULATED.3IONS-SCNC: 11 MMOL/L (ref 7–15)
AST SERPL W P-5'-P-CCNC: 14 U/L (ref 0–45)
BILIRUB SERPL-MCNC: 0.8 MG/DL
BUN SERPL-MCNC: 17.5 MG/DL (ref 6–20)
CALCIUM SERPL-MCNC: 8.9 MG/DL (ref 8.8–10.4)
CHLORIDE SERPL-SCNC: 98 MMOL/L (ref 98–107)
CREAT SERPL-MCNC: 1.01 MG/DL (ref 0.67–1.17)
EGFRCR SERPLBLD CKD-EPI 2021: >90 ML/MIN/1.73M2
ERYTHROCYTE [DISTWIDTH] IN BLOOD BY AUTOMATED COUNT: 12.6 % (ref 10–15)
GLUCOSE SERPL-MCNC: 127 MG/DL (ref 70–99)
HCO3 SERPL-SCNC: 23 MMOL/L (ref 22–29)
HCT VFR BLD AUTO: 42 % (ref 40–53)
HGB BLD-MCNC: 14.8 G/DL (ref 13.3–17.7)
MCH RBC QN AUTO: 30.1 PG (ref 26.5–33)
MCHC RBC AUTO-ENTMCNC: 35.2 G/DL (ref 31.5–36.5)
MCV RBC AUTO: 86 FL (ref 78–100)
PLATELET # BLD AUTO: 152 10E3/UL (ref 150–450)
POTASSIUM SERPL-SCNC: 3.9 MMOL/L (ref 3.4–5.3)
PROT SERPL-MCNC: 6.7 G/DL (ref 6.4–8.3)
RBC # BLD AUTO: 4.91 10E6/UL (ref 4.4–5.9)
SODIUM SERPL-SCNC: 132 MMOL/L (ref 135–145)
WBC # BLD AUTO: 17.3 10E3/UL (ref 4–11)

## 2025-03-25 PROCEDURE — 99232 SBSQ HOSP IP/OBS MODERATE 35: CPT | Performed by: STUDENT IN AN ORGANIZED HEALTH CARE EDUCATION/TRAINING PROGRAM

## 2025-03-25 PROCEDURE — 85027 COMPLETE CBC AUTOMATED: CPT | Performed by: INTERNAL MEDICINE

## 2025-03-25 PROCEDURE — 36415 COLL VENOUS BLD VENIPUNCTURE: CPT | Performed by: INTERNAL MEDICINE

## 2025-03-25 PROCEDURE — 250N000011 HC RX IP 250 OP 636: Mod: JZ | Performed by: INTERNAL MEDICINE

## 2025-03-25 PROCEDURE — 120N000004 HC R&B MS OVERFLOW

## 2025-03-25 PROCEDURE — 80053 COMPREHEN METABOLIC PANEL: CPT | Performed by: INTERNAL MEDICINE

## 2025-03-25 PROCEDURE — 250N000013 HC RX MED GY IP 250 OP 250 PS 637: Performed by: INTERNAL MEDICINE

## 2025-03-25 RX ADMIN — ONDANSETRON 4 MG: 2 INJECTION, SOLUTION INTRAMUSCULAR; INTRAVENOUS at 21:03

## 2025-03-25 RX ADMIN — POLYETHYLENE GLYCOL 3350 17 G: 17 POWDER, FOR SOLUTION ORAL at 08:48

## 2025-03-25 RX ADMIN — KETOROLAC TROMETHAMINE 15 MG: 15 INJECTION, SOLUTION INTRAMUSCULAR; INTRAVENOUS at 10:07

## 2025-03-25 RX ADMIN — HYDROMORPHONE HYDROCHLORIDE 0.4 MG: 0.2 INJECTION, SOLUTION INTRAMUSCULAR; INTRAVENOUS; SUBCUTANEOUS at 06:48

## 2025-03-25 RX ADMIN — OXYCODONE HYDROCHLORIDE 5 MG: 5 TABLET ORAL at 20:45

## 2025-03-25 RX ADMIN — ACETAMINOPHEN 650 MG: 325 TABLET, FILM COATED ORAL at 20:46

## 2025-03-25 RX ADMIN — ONDANSETRON 4 MG: 2 INJECTION, SOLUTION INTRAMUSCULAR; INTRAVENOUS at 00:50

## 2025-03-25 RX ADMIN — HYDROMORPHONE HYDROCHLORIDE 0.4 MG: 0.2 INJECTION, SOLUTION INTRAMUSCULAR; INTRAVENOUS; SUBCUTANEOUS at 18:38

## 2025-03-25 RX ADMIN — KETOROLAC TROMETHAMINE 15 MG: 15 INJECTION, SOLUTION INTRAMUSCULAR; INTRAVENOUS at 16:10

## 2025-03-25 RX ADMIN — HYDROMORPHONE HYDROCHLORIDE 0.4 MG: 0.2 INJECTION, SOLUTION INTRAMUSCULAR; INTRAVENOUS; SUBCUTANEOUS at 13:14

## 2025-03-25 RX ADMIN — OXYCODONE HYDROCHLORIDE 5 MG: 5 TABLET ORAL at 00:27

## 2025-03-25 ASSESSMENT — ACTIVITIES OF DAILY LIVING (ADL)
ADLS_ACUITY_SCORE: 15

## 2025-03-25 NOTE — PROGRESS NOTES
"GASTROENTEROLOGY PROGRESS NOTE    SUBJECTIVE: Reports pain is better today.  He is taking Dilaudid and Toradol, alternating between the two every 2 hours.  He would like to try clear liquids later today.     OBJECTIVE:    /77 (BP Location: Right arm, Patient Position: Semi-Collins's, Cuff Size: Adult Large)   Pulse 87   Temp 99  F (37.2  C) (Axillary)   Resp 20   Ht 1.778 m (5' 10\")   Wt 100.4 kg (221 lb 6.4 oz)   SpO2 94%   BMI 31.77 kg/m    Temp (24hrs), Av.3  F (37.4  C), Min:98.1  F (36.7  C), Max:100.6  F (38.1  C)    Patient Vitals for the past 72 hrs:   Weight   25 0015 100.4 kg (221 lb 6.4 oz)   25 102.7 kg (226 lb 6.6 oz)     No intake or output data in the 24 hours ending 25 0647      PHYSICAL EXAM    Constitutional: NAD, comfortable  Cardiovascular: RRR  Respiratory: CTAB  Abdomen: soft, non-tender, nondistended        Additional Comments:  ROS, FH, SH: See initial GI consult for details.    I have reviewed the patient's new clinical lab results:    Recent Labs   Lab Test 25  0649 03/23/25  2025 10/14/24  1041   WBC 13.7* 12.1* 8.8   HGB 15.2 15.4 16.6   MCV 86 85 89    197 242     Recent Labs   Lab Test 25  0649 03/23/25  2025 10/14/24  1041    136 136   POTASSIUM 4.3 3.9 4.4   CHLORIDE 104 100 102   CO2 24 27 26   BUN 14.8 12.7 19.3   CR 1.01 1.15 1.07   ANIONGAP 10 9 8   WAQAS 9.0 9.6 9.7     Recent Labs   Lab Test 25  0649 03/23/25  2025 10/14/24  1041 22  2319   ALBUMIN 3.8 4.4 4.6 4.4   BILITOTAL 0.5 0.4 0.4 0.8   ALT 25 29 34 31   AST 17 20 27 28   ALKPHOS 69 77 70 78   LIPASE 266* 666*  --  631*         Acute idiopathic pancreatitis.  Try clear liquids today.  We will continue to follow.      Eleuterio Mittal MD  [facility]  "

## 2025-03-25 NOTE — PLAN OF CARE
Activity: SBA  Diet/BS Checks: clear liquids  IV Access/Drains: PIV SL  Pain Management: prn dilaudid, Toradol,   Abnormal VS/Results: VSS on RA   D/C Disposition: pending   Other Info:   -pt using tpump            Problem: Adult Inpatient Plan of Care  Goal: Absence of Hospital-Acquired Illness or Injury  Intervention: Identify and Manage Fall Risk  Recent Flowsheet Documentation  Taken 3/25/2025 0850 by Vangie Ambrocio RN  Safety Promotion/Fall Prevention:   clutter free environment maintained   nonskid shoes/slippers when out of bed  Intervention: Prevent Skin Injury  Recent Flowsheet Documentation  Taken 3/25/2025 0850 by Vangie Ambrocio RN  Body Position: position changed independently  Skin Protection: adhesive use limited  Intervention: Prevent Infection  Recent Flowsheet Documentation  Taken 3/25/2025 0850 by Vangie Ambrocio RN  Infection Prevention: rest/sleep promoted     Problem: Pancreatitis  Goal: Effective Oxygenation and Ventilation  Intervention: Optimize Oxygenation and Ventilation  Recent Flowsheet Documentation  Taken 3/25/2025 0850 by Vangie Ambrocio RN  Activity Management: activity adjusted per tolerance  Head of Bed (HOB) Positioning: HOB at 20-30 degrees     Problem: Pain Acute  Goal: Optimal Pain Control and Function  Intervention: Prevent or Manage Pain  Recent Flowsheet Documentation  Taken 3/25/2025 0850 by Vangie Ambrocio RN  Medication Review/Management: medications reviewed

## 2025-03-25 NOTE — PLAN OF CARE
"Pt alert and oriented. VSS. RA. Temp was 100.6 , given Tylenol and later dropped down. Pain controlled by Toradol, oxycodone, Dilaudid and Scheduled Tylenol. Also using Tpump. NPO.   Problem: Adult Inpatient Plan of Care  Goal: Plan of Care Review  Description: The Plan of Care Review/Shift note should be completed every shift.  The Outcome Evaluation is a brief statement about your assessment that the patient is improving, declining, or no change.  This information will be displayed automatically on your shiftnote.  3/25/2025 0447 by Keysha Donaldson RN  Outcome: Not Progressing  Flowsheets (Taken 3/25/2025 0447)  Outcome Evaluation: pt NPO. Pain controlled with Toradol, oxycodone, Tylenol.  Plan of Care Reviewed With: patient  Overall Patient Progress: no change  3/25/2025 0430 by Keysha Donaldson RN  Outcome: Progressing  Goal: Patient-Specific Goal (Individualized)  Description: You can add care plan individualizations to a care plan. Examples of Individualization might be:  \"Parent requests to be called daily at 9am for status\", \"I have a hard time hearing out of my right ear\", or \"Do not touch me to wake me up as it startlesme\".  3/25/2025 0447 by Keysha Donaldson RN  Outcome: Not Progressing  3/25/2025 0430 by Keysha Donaldson RN  Outcome: Progressing  Goal: Absence of Hospital-Acquired Illness or Injury  3/25/2025 0447 by Keysha Donaldson RN  Outcome: Not Progressing  3/25/2025 0430 by Keysha Donaldson RN  Outcome: Progressing  Intervention: Identify and Manage Fall Risk  Recent Flowsheet Documentation  Taken 3/24/2025 2129 by Keysha Donaldson RN  Safety Promotion/Fall Prevention:   clutter free environment maintained   nonskid shoes/slippers when out of bed  Intervention: Prevent Skin Injury  Recent Flowsheet Documentation  Taken 3/24/2025 2129 by Keysha Donaldson RN  Body Position: position changed independently  Intervention: Prevent Infection  Recent Flowsheet Documentation  Taken 3/24/2025 2129 by Keysha Donaldson RN  Infection " Prevention: rest/sleep promoted  Goal: Optimal Comfort and Wellbeing  3/25/2025 0447 by Keysha Donaldson RN  Outcome: Not Progressing  3/25/2025 0430 by Keysha Donaldson RN  Outcome: Progressing  Goal: Readiness for Transition of Care  3/25/2025 0447 by Keysha Donaldson RN  Outcome: Not Progressing  3/25/2025 0430 by Keysha Donaldson RN  Outcome: Progressing     Problem: Pancreatitis  Goal: Fluid and Electrolyte Balance  3/25/2025 0447 by Keysha Donaldson RN  Outcome: Not Progressing  3/25/2025 0430 by Keysha Donaldson RN  Outcome: Progressing  Goal: Absence of Infection Signs and Symptoms  3/25/2025 0447 by Keysha Donaldson RN  Outcome: Not Progressing  3/25/2025 0430 by Keysha Donaldson RN  Outcome: Progressing  Goal: Optimal Nutrition Delivery  3/25/2025 0447 by Keysha Donaldson RN  Outcome: Not Progressing  3/25/2025 0430 by Keysha Donaldson RN  Outcome: Progressing  Goal: Optimal Pain Control and Function  3/25/2025 0447 by Keysha Donaldson RN  Outcome: Not Progressing  3/25/2025 0430 by Keysha Donaldson RN  Outcome: Progressing  Goal: Effective Oxygenation and Ventilation  3/25/2025 0447 by Keysha Donaldson RN  Outcome: Not Progressing  3/25/2025 0430 by Keysha Donaldson RN  Outcome: Progressing  Intervention: Optimize Oxygenation and Ventilation  Recent Flowsheet Documentation  Taken 3/24/2025 2129 by Keysha Donaldson RN  Activity Management: activity adjusted per tolerance  Head of Bed (HOB) Positioning: HOB at 20-30 degrees     Problem: Pain Acute  Goal: Optimal Pain Control and Function  3/25/2025 0447 by Keysha Donaldson RN  Outcome: Not Progressing  3/25/2025 0430 by Keysha Donaldson RN  Outcome: Progressing  Intervention: Prevent or Manage Pain  Recent Flowsheet Documentation  Taken 3/24/2025 2129 by Keysha Donaldson RN  Medication Review/Management: medications reviewed   Goal Outcome Evaluation:      Plan of Care Reviewed With: patient    Overall Patient Progress: no changeOverall Patient Progress: no change    Outcome Evaluation: pt NPO. Pain controlled  with Toradol, oxycodone, Tylenol.

## 2025-03-25 NOTE — PROGRESS NOTES
[facility]    Medicine Progress Note - Hospitalist Service    Date of Admission:  3/23/2025    Assessment & Plan     Summary of Stay: Deandre Lemus is a 45 year old male with a history of an episode of acute pancreatitis in 9/2022 deemed idiopathic admitted on 3/23/2025 with recurrent episode of pancreatitis      First episode of pancreatitis 8/2022.  Deemed idiopathic.  No e/o significant alcohol use, no biliary issue identified.  Post pancreatitis underwent EUS and noted to have mass at GE junction ->EGD with bx consistent with gastric leiomyoma (benign).  MRCP negative as well.  He has not had any alcohol since that time.  Per [facility] note in follow-up 11/2022, felt could just be an isolated episode but if it recurs would recommend w/up for genetic causes.      He came into the ER with sx similar to his last episode.  Epigastric pain radiating to his back with associated nausea but no vomiting.  Sx were fairly mild prior to dinner last night but afterwards the pain and nausea was significantly worse. There was some radiation to the back. No fevers or chills.  Decided to come in as he felt that this was another episode of pancreatitis      Problem List:   Recurrent acute pancreatitis   Previously felt to be idiopathic.  He has not had any alcohol since his initial dx in 2022. Denies any trauma.  No medications.  Presented with epigastric pain radiating to back, associated with elevated lipase and CT evidence of peripancreatic inflammatory stranding about the neck, head, and the uncinate process of pancreas, compatible with acute interstitial pancreatitis.  Patient was made n.p.o. and started on IV fluids and pain management.  Patient reported improvement in pain.  GI consulted, appreciated recommendations.  Recommended follow-up with Dr. Sena from [facility] for genetic causes.     -Started on clear liquid diet  - Continue IV fluids for today as well, will stop when patient is able to eat more  "regular diet  - Antiemetics and pain medications      Fever and leukocytosis  Patient was noted to have fever around 100.6, and WBC elevated at 17. Over all review of system is fairly negative except for epigastric abdominal pain which appears to be improving and related to pancreatitis.  Suspect fever and leukocytosis are SIRS response in the setting of acute pancreatitis rather than acute infection.  - Monitor fever curve and CBC  - Holding antibiotics at this time     Hx of gastric leiomyoma--this was biopsy-prove.  Benign and was advised that no further follow-up was recommended.     Mild thickening at the GE junction  This was noted at the CT abdomen and pelvis.  Per radiology report this may just represent decompressed bowel however esophagogastritis or a recurrent mass in the region are not excluded.  Suspect this is patient's prior leiomyoma.  Advised patient to discuss this with [facility] when he follows up pancreatitis issues.  I doubt that they will to further workup like endoscopy              Diet: Clear Liquid Diet    DVT Prophylaxis: Pneumatic Compression Devices  Wang Catheter: Not present  Lines: None     Cardiac Monitoring: None  Code Status: Full Code      Clinically Significant Risk Factors         # Hyponatremia: Lowest Na = 132 mmol/L in last 2 days, will monitor as appropriate                      # Obesity: Estimated body mass index is 31.77 kg/m  as calculated from the following:    Height as of this encounter: 1.778 m (5' 10\").    Weight as of this encounter: 100.4 kg (221 lb 6.4 oz)., PRESENT ON ADMISSION            Social Drivers of Health    Tobacco Use: Medium Risk (10/14/2024)    Patient History     Smoking Tobacco Use: Former     Smokeless Tobacco Use: Former   Social Connections: Unknown (3/7/2024)    Social Connection and Isolation Panel [NHANES]     Frequency of Social Gatherings with Friends and Family: Once a week          Disposition Plan     Medically Ready for Discharge: " Anticipated Tomorrow             Lindsey Schofield MD  Hospitalist Service  [facility]  Securely message with [employer] (more info)  Text page via [employer] Paging/Directory   ______________________________________________________________________    Interval History   Overnight patient was febrile up to 100.6.  Denies any chills.  Abdominal pain is improving.  No new symptoms.  Does not feel hungry but willing to try clear liquid.    4 Point review of systems otherwise negative    Physical Exam   Vital Signs: Temp: 99  F (37.2  C) Temp src: Oral BP: 133/85 Pulse: 92   Resp: 18 SpO2: 94 % O2 Device: None (Room air)    Weight: 221 lbs 6.4 oz    Constitutional: awake, alert, cooperative, no apparent distress, and appears stated age  Eyes: Anicteric sclera  ENT: normocephalic, without obvious abnormality  Respiratory: On room air, equal air entry bilaterally, no wheezing or crackles  Cardiovascular: Normal rate, regular rhythm, no murmur  GI: Mild epigastric tenderness, soft, nondistended abdomen  Musculoskeletal: no lower extremity pitting edema present  Neurologic: Awake, alert, oriented x 3  Neuropsychiatric: Appropriate mood and affect    Medical Decision Making       45 MINUTES SPENT BY ME on the date of service doing chart review, history, exam, documentation & further activities per the note.      Data   ------------------------- PAST 24 HR DATA REVIEWED -----------------------------------------------

## 2025-03-26 LAB
ERYTHROCYTE [DISTWIDTH] IN BLOOD BY AUTOMATED COUNT: 12.3 % (ref 10–15)
HCT VFR BLD AUTO: 38.3 % (ref 40–53)
HGB BLD-MCNC: 13.3 G/DL (ref 13.3–17.7)
MCH RBC QN AUTO: 29.8 PG (ref 26.5–33)
MCHC RBC AUTO-ENTMCNC: 34.7 G/DL (ref 31.5–36.5)
MCV RBC AUTO: 86 FL (ref 78–100)
PLATELET # BLD AUTO: 152 10E3/UL (ref 150–450)
RBC # BLD AUTO: 4.46 10E6/UL (ref 4.4–5.9)
WBC # BLD AUTO: 13.1 10E3/UL (ref 4–11)

## 2025-03-26 PROCEDURE — 85041 AUTOMATED RBC COUNT: CPT | Performed by: STUDENT IN AN ORGANIZED HEALTH CARE EDUCATION/TRAINING PROGRAM

## 2025-03-26 PROCEDURE — 120N000004 HC R&B MS OVERFLOW

## 2025-03-26 PROCEDURE — 250N000013 HC RX MED GY IP 250 OP 250 PS 637: Performed by: INTERNAL MEDICINE

## 2025-03-26 PROCEDURE — 250N000011 HC RX IP 250 OP 636: Mod: JZ | Performed by: INTERNAL MEDICINE

## 2025-03-26 PROCEDURE — 99232 SBSQ HOSP IP/OBS MODERATE 35: CPT | Performed by: STUDENT IN AN ORGANIZED HEALTH CARE EDUCATION/TRAINING PROGRAM

## 2025-03-26 PROCEDURE — 250N000013 HC RX MED GY IP 250 OP 250 PS 637: Performed by: STUDENT IN AN ORGANIZED HEALTH CARE EDUCATION/TRAINING PROGRAM

## 2025-03-26 PROCEDURE — 36415 COLL VENOUS BLD VENIPUNCTURE: CPT | Performed by: STUDENT IN AN ORGANIZED HEALTH CARE EDUCATION/TRAINING PROGRAM

## 2025-03-26 RX ORDER — OXYCODONE HYDROCHLORIDE 5 MG/1
5-10 TABLET ORAL EVERY 4 HOURS PRN
Status: DISCONTINUED | OUTPATIENT
Start: 2025-03-26 | End: 2025-03-27 | Stop reason: HOSPADM

## 2025-03-26 RX ADMIN — POLYETHYLENE GLYCOL 3350 17 G: 17 POWDER, FOR SOLUTION ORAL at 10:09

## 2025-03-26 RX ADMIN — OXYCODONE HYDROCHLORIDE 5 MG: 5 TABLET ORAL at 11:59

## 2025-03-26 RX ADMIN — HYDROMORPHONE HYDROCHLORIDE 0.4 MG: 0.2 INJECTION, SOLUTION INTRAMUSCULAR; INTRAVENOUS; SUBCUTANEOUS at 00:52

## 2025-03-26 RX ADMIN — OXYCODONE HYDROCHLORIDE 5 MG: 5 TABLET ORAL at 16:50

## 2025-03-26 RX ADMIN — OXYCODONE HYDROCHLORIDE 5 MG: 5 TABLET ORAL at 21:08

## 2025-03-26 RX ADMIN — ACETAMINOPHEN 650 MG: 325 TABLET, FILM COATED ORAL at 21:08

## 2025-03-26 RX ADMIN — OXYCODONE HYDROCHLORIDE 5 MG: 5 TABLET ORAL at 04:11

## 2025-03-26 RX ADMIN — ACETAMINOPHEN 650 MG: 325 TABLET, FILM COATED ORAL at 04:11

## 2025-03-26 ASSESSMENT — ACTIVITIES OF DAILY LIVING (ADL)
ADLS_ACUITY_SCORE: 15

## 2025-03-26 NOTE — PLAN OF CARE
"Activity: ind  Diet/BS Checks: full liquids  IV Access/Drains: PIV SL  Pain Management: prn oxy  Abnormal VS/Results: VSS on RA   D/C Disposition: pending   Other Info:   -pt using tpump      Problem: Adult Inpatient Plan of Care  Goal: Plan of Care Review  Description: The Plan of Care Review/Shift note should be completed every shift.  The Outcome Evaluation is a brief statement about your assessment that the patient is improving, declining, or no change.  This information will be displayed automatically on your shiftnote.  Outcome: Progressing  Goal: Patient-Specific Goal (Individualized)  Description: You can add care plan individualizations to a care plan. Examples of Individualization might be:  \"Parent requests to be called daily at 9am for status\", \"I have a hard time hearing out of my right ear\", or \"Do not touch me to wake me up as it startlesme\".  Outcome: Progressing  Goal: Absence of Hospital-Acquired Illness or Injury  Outcome: Progressing  Intervention: Identify and Manage Fall Risk  Recent Flowsheet Documentation  Taken 3/26/2025 1016 by Vangie Ambrocio RN  Safety Promotion/Fall Prevention:   clutter free environment maintained   nonskid shoes/slippers when out of bed  Intervention: Prevent Skin Injury  Recent Flowsheet Documentation  Taken 3/26/2025 1016 by Vangie Ambrocio RN  Body Position: position changed independently  Skin Protection: adhesive use limited  Intervention: Prevent Infection  Recent Flowsheet Documentation  Taken 3/26/2025 1016 by Vangie Ambrocio RN  Infection Prevention:   equipment surfaces disinfected   environmental surveillance performed   hand hygiene promoted   personal protective equipment utilized   single patient room provided   rest/sleep promoted  Goal: Optimal Comfort and Wellbeing  Outcome: Progressing  Goal: Readiness for Transition of Care  Outcome: Progressing     Problem: Pancreatitis  Goal: Fluid and Electrolyte Balance  Outcome: Progressing  Goal: Absence " of Infection Signs and Symptoms  Outcome: Progressing  Goal: Optimal Nutrition Delivery  Outcome: Progressing  Goal: Optimal Pain Control and Function  Outcome: Progressing  Goal: Effective Oxygenation and Ventilation  Outcome: Progressing  Intervention: Optimize Oxygenation and Ventilation  Recent Flowsheet Documentation  Taken 3/26/2025 1016 by Vangie Ambrocio, RN  Activity Management: activity adjusted per tolerance  Head of Bed (HOB) Positioning: HOB at 20-30 degrees     Problem: Pain Acute  Goal: Optimal Pain Control and Function  Outcome: Progressing  Intervention: Prevent or Manage Pain  Recent Flowsheet Documentation  Taken 3/26/2025 1016 by Vangie Ambrocio, RN  Medication Review/Management: medications reviewed

## 2025-03-26 NOTE — PLAN OF CARE
"Goal Outcome Evaluation:      Plan of Care Reviewed With: patient    Overall Patient Progress: no changeOverall Patient Progress: no change    VSS. Alert and oriented. Pain managed with dilaudid, tylenol, oxycodone and heat pump. Intermittent nausea zofran x1 given. On clear liquids. IND in room. PIV SL.     Problem: Adult Inpatient Plan of Care  Goal: Plan of Care Review  Description: The Plan of Care Review/Shift note should be completed every shift.  The Outcome Evaluation is a brief statement about your assessment that the patient is improving, declining, or no change.  This information will be displayed automatically on your shiftnote.  Outcome: Progressing  Flowsheets (Taken 3/26/2025 0515)  Plan of Care Reviewed With: patient  Overall Patient Progress: no change  Goal: Patient-Specific Goal (Individualized)  Description: You can add care plan individualizations to a care plan. Examples of Individualization might be:  \"Parent requests to be called daily at 9am for status\", \"I have a hard time hearing out of my right ear\", or \"Do not touch me to wake me up as it startlesme\".  Outcome: Progressing  Goal: Absence of Hospital-Acquired Illness or Injury  Outcome: Progressing  Intervention: Identify and Manage Fall Risk  Recent Flowsheet Documentation  Taken 3/25/2025 2045 by Susie Miner RN  Safety Promotion/Fall Prevention:   clutter free environment maintained   nonskid shoes/slippers when out of bed  Intervention: Prevent Skin Injury  Recent Flowsheet Documentation  Taken 3/25/2025 2045 by Susie Miner, RN  Body Position: position changed independently  Intervention: Prevent Infection  Recent Flowsheet Documentation  Taken 3/25/2025 2045 by Susie Miner, RN  Infection Prevention:   equipment surfaces disinfected   environmental surveillance performed   hand hygiene promoted   personal protective equipment utilized   single patient room provided   rest/sleep promoted  Goal: Optimal Comfort and " Wellbeing  Outcome: Progressing  Intervention: Monitor Pain and Promote Comfort  Recent Flowsheet Documentation  Taken 3/25/2025 2045 by Susie Miner, RN  Pain Management Interventions: medication (see MAR)  Goal: Readiness for Transition of Care  Outcome: Progressing

## 2025-03-26 NOTE — PROGRESS NOTES
[facility]    Medicine Progress Note - Hospitalist Service    Date of Admission:  3/23/2025    Assessment & Plan     Summary of Stay: Deandre Lemus is a 45 year old male with a history of an episode of acute pancreatitis in 9/2022 deemed idiopathic admitted on 3/23/2025 with recurrent episode of pancreatitis      First episode of pancreatitis 8/2022.  Deemed idiopathic.  No e/o significant alcohol use, no biliary issue identified.  Post pancreatitis underwent EUS and noted to have mass at GE junction ->EGD with bx consistent with gastric leiomyoma (benign).  MRCP negative as well.  He has not had any alcohol since that time.  Per [facility] note in follow-up 11/2022, felt could just be an isolated episode but if it recurs would recommend w/up for genetic causes.      He came into the ER with sx similar to his last episode.  Epigastric pain radiating to his back with associated nausea but no vomiting.  Sx were fairly mild prior to dinner last night but afterwards the pain and nausea was significantly worse. There was some radiation to the back. No fevers or chills.  Decided to come in as he felt that this was another episode of pancreatitis      Problem List:   Recurrent acute pancreatitis   Previously felt to be idiopathic.  He has not had any alcohol since his initial dx in 2022. Denies any trauma.  No medications.  Presented with epigastric pain radiating to back, associated with elevated lipase and CT evidence of peripancreatic inflammatory stranding about the neck, head, and the uncinate process of pancreas, compatible with acute interstitial pancreatitis.  Patient was made n.p.o. and started on IV fluids and pain management.  Patient reported improvement in pain.  GI consulted, appreciated recommendations.  Recommended follow-up with Dr. Sena from [facility] for genetic causes.  Started on clear liquid diet on 3/25, patient reported some nausea but overall feeling improved so would advance diet  "further    - Full liquid diet  - Antiemetics and pain medications.  Discussed with patient to avoid IV pain medication as much as possible.  Increase the p.o. oxycodone and provided range of 5 to 10 mg for moderate to severe pain respectively.  - GI consulted, appreciate recommendations.  Outpatient GI follow-up for genetic studies.      Fever and leukocytosis  Patient was noted to have fever around 100.6, and WBC elevated at 17. Over all review of system is fairly negative except for epigastric abdominal pain which appears to be improving and related to pancreatitis.  Suspect fever and leukocytosis are SIRS response in the setting of acute pancreatitis rather than acute infection.  Cytosis is started to improve.  Patient continues to have fever.  Declines further workup for fever at this time  - Monitor fever curve and CBC  - Holding antibiotics at this time     Hx of gastric leiomyoma--this was biopsy-prove.  Benign and was advised that no further follow-up was recommended.     Mild thickening at the GE junction  This was noted at the CT abdomen and pelvis.  Per radiology report this may just represent decompressed bowel however esophagogastritis or a recurrent mass in the region are not excluded.  Suspect this is patient's prior leiomyoma.  Advised patient to discuss this with [facility] when he follows up pancreatitis issues.  I doubt that they will to further workup like endoscopy              Diet: Full Liquid Diet    DVT Prophylaxis: Pneumatic Compression Devices  Wang Catheter: Not present  Lines: None     Cardiac Monitoring: None  Code Status: Full Code      Clinically Significant Risk Factors         # Hyponatremia: Lowest Na = 132 mmol/L in last 2 days, will monitor as appropriate                      # Obesity: Estimated body mass index is 31.77 kg/m  as calculated from the following:    Height as of this encounter: 1.778 m (5' 10\").    Weight as of this encounter: 100.4 kg (221 lb 6.4 oz)., PRESENT ON " ADMISSION            Social Drivers of Health    Tobacco Use: Medium Risk (10/14/2024)    Patient History     Smoking Tobacco Use: Former     Smokeless Tobacco Use: Former   Social Connections: Unknown (3/7/2024)    Social Connection and Isolation Panel [NHANES]     Frequency of Social Gatherings with Friends and Family: Once a week          Disposition Plan     Medically Ready for Discharge: Anticipated Tomorrow             Lindsey Schofield MD  Hospitalist Service  [facility]  Securely message with [employer] (more info)  Text page via [employer] Paging/Directory   ______________________________________________________________________    Interval History   Overnight patient was febrile up to 101.  Denies any chills.  Abdominal pain is improving.  No new symptoms.  Feels hungry today.   4 Point review of systems otherwise negative    Physical Exam   Vital Signs: Temp: 98.5  F (36.9  C) Temp src: Oral BP: 115/72 Pulse: 72   Resp: 18 SpO2: 94 % O2 Device: None (Room air)    Weight: 221 lbs 6.4 oz    Constitutional: awake, alert, cooperative, no apparent distress, and appears stated age  Eyes: Anicteric sclera  ENT: normocephalic, without obvious abnormality  Respiratory: On room air, equal air entry bilaterally, no wheezing or crackles  Cardiovascular: Normal rate, regular rhythm, no murmur  GI: Mild epigastric tenderness, soft, nondistended abdomen  Musculoskeletal: no lower extremity pitting edema present  Neurologic: Awake, alert, oriented x 3  Neuropsychiatric: Appropriate mood and affect    Medical Decision Making       44 MINUTES SPENT BY ME on the date of service doing chart review, history, exam, documentation & further activities per the note.      Data   ------------------------- PAST 24 HR DATA REVIEWED -----------------------------------------------

## 2025-03-26 NOTE — PROGRESS NOTES
"GASTROENTEROLOGY PROGRESS NOTE    SUBJECTIVE:  His pain is better today, has not required any pain medication for 5 hours.  Tolerated clear liquid diet yesterday, although does report feeling a little nauseous after the jello but did okay with diluted apple juice    OBJECTIVE:    /72   Pulse 72   Temp 98.5  F (36.9  C) (Oral)   Resp 18   Ht 1.778 m (5' 10\")   Wt 100.4 kg (221 lb 6.4 oz)   SpO2 94%   BMI 31.77 kg/m    Temp (24hrs), Av.3  F (37.4  C), Min:98.3  F (36.8  C), Max:101  F (38.3  C)    Patient Vitals for the past 72 hrs:   Weight   25 001 100.4 kg (221 lb 6.4 oz)   25 102.7 kg (226 lb 6.6 oz)       Intake/Output Summary (Last 24 hours) at 3/26/2025 0644  Last data filed at 3/25/2025 2049  Gross per 24 hour   Intake 743 ml   Output 200 ml   Net 543 ml         PHYSICAL EXAM    Constitutional: NAD, comfortable  Cardiovascular: RRR  Respiratory: CTAB  Abdomen: soft, non-tender, nondistended        Additional Comments:  ROS, FH, SH: See initial GI consult for details.    I have reviewed the patient's new clinical lab results:    Recent Labs   Lab Test 25  0709 25  0649 25   WBC 17.3* 13.7* 12.1*   HGB 14.8 15.2 15.4   MCV 86 86 85    166 197     Recent Labs   Lab Test 25  0709 25  0649 25   * 138 136   POTASSIUM 3.9 4.3 3.9   CHLORIDE 98 104 100   CO2    BUN 17.5 14.8 12.7   CR 1.01 1.01 1.15   ANIONGAP 11 10 9   WAQAS 8.9 9.0 9.6     Recent Labs   Lab Test 25  0709 25  0649 03/23/25  2025 10/14/24  1041 22  2319   ALBUMIN 3.6 3.8 4.4   < > 4.4   BILITOTAL 0.8 0.5 0.4   < > 0.8   ALT 18 25 29   < > 31   AST 14 17 20   < > 28   ALKPHOS 66 69 77   < > 78   LIPASE  --  266* 666*  --  631*    < > = values in this interval not displayed.         Idiopathic pancreatitis, improving.  Full liquid diet today, if tolerates then low fat diet tomorrow and hopefully can go home tomorrow.    Erika Harden " MD D[place]  [facility]

## 2025-03-27 VITALS
TEMPERATURE: 98.3 F | BODY MASS INDEX: 31.7 KG/M2 | SYSTOLIC BLOOD PRESSURE: 115 MMHG | RESPIRATION RATE: 18 BRPM | HEIGHT: 70 IN | WEIGHT: 221.4 LBS | DIASTOLIC BLOOD PRESSURE: 72 MMHG | OXYGEN SATURATION: 94 % | HEART RATE: 73 BPM

## 2025-03-27 LAB
ANION GAP SERPL CALCULATED.3IONS-SCNC: 10 MMOL/L (ref 7–15)
BUN SERPL-MCNC: 13.3 MG/DL (ref 6–20)
CALCIUM SERPL-MCNC: 9.1 MG/DL (ref 8.8–10.4)
CHLORIDE SERPL-SCNC: 99 MMOL/L (ref 98–107)
CREAT SERPL-MCNC: 0.94 MG/DL (ref 0.67–1.17)
EGFRCR SERPLBLD CKD-EPI 2021: >90 ML/MIN/1.73M2
ERYTHROCYTE [DISTWIDTH] IN BLOOD BY AUTOMATED COUNT: 12.1 % (ref 10–15)
GLUCOSE SERPL-MCNC: 108 MG/DL (ref 70–99)
HCO3 SERPL-SCNC: 25 MMOL/L (ref 22–29)
HCT VFR BLD AUTO: 40 % (ref 40–53)
HGB BLD-MCNC: 13.9 G/DL (ref 13.3–17.7)
MCH RBC QN AUTO: 29.4 PG (ref 26.5–33)
MCHC RBC AUTO-ENTMCNC: 34.8 G/DL (ref 31.5–36.5)
MCV RBC AUTO: 85 FL (ref 78–100)
PLATELET # BLD AUTO: 166 10E3/UL (ref 150–450)
POTASSIUM SERPL-SCNC: 3.7 MMOL/L (ref 3.4–5.3)
RBC # BLD AUTO: 4.73 10E6/UL (ref 4.4–5.9)
SODIUM SERPL-SCNC: 134 MMOL/L (ref 135–145)
WBC # BLD AUTO: 9.5 10E3/UL (ref 4–11)

## 2025-03-27 PROCEDURE — 82310 ASSAY OF CALCIUM: CPT | Performed by: STUDENT IN AN ORGANIZED HEALTH CARE EDUCATION/TRAINING PROGRAM

## 2025-03-27 PROCEDURE — 85027 COMPLETE CBC AUTOMATED: CPT | Performed by: STUDENT IN AN ORGANIZED HEALTH CARE EDUCATION/TRAINING PROGRAM

## 2025-03-27 PROCEDURE — 250N000013 HC RX MED GY IP 250 OP 250 PS 637: Performed by: INTERNAL MEDICINE

## 2025-03-27 PROCEDURE — 99239 HOSP IP/OBS DSCHRG MGMT >30: CPT | Performed by: STUDENT IN AN ORGANIZED HEALTH CARE EDUCATION/TRAINING PROGRAM

## 2025-03-27 PROCEDURE — 250N000013 HC RX MED GY IP 250 OP 250 PS 637: Performed by: STUDENT IN AN ORGANIZED HEALTH CARE EDUCATION/TRAINING PROGRAM

## 2025-03-27 PROCEDURE — 36415 COLL VENOUS BLD VENIPUNCTURE: CPT | Performed by: STUDENT IN AN ORGANIZED HEALTH CARE EDUCATION/TRAINING PROGRAM

## 2025-03-27 RX ORDER — OXYCODONE HYDROCHLORIDE 5 MG/1
5 TABLET ORAL EVERY 6 HOURS PRN
Qty: 20 TABLET | Refills: 0 | Status: SHIPPED | OUTPATIENT
Start: 2025-03-27 | End: 2025-04-18

## 2025-03-27 RX ADMIN — OXYCODONE HYDROCHLORIDE 5 MG: 5 TABLET ORAL at 05:57

## 2025-03-27 RX ADMIN — POLYETHYLENE GLYCOL 3350 17 G: 17 POWDER, FOR SOLUTION ORAL at 09:11

## 2025-03-27 ASSESSMENT — ACTIVITIES OF DAILY LIVING (ADL)
ADLS_ACUITY_SCORE: 15
ADLS_ACUITY_SCORE: 16
ADLS_ACUITY_SCORE: 15
ADLS_ACUITY_SCORE: 16
ADLS_ACUITY_SCORE: 16
ADLS_ACUITY_SCORE: 15
ADLS_ACUITY_SCORE: 16
ADLS_ACUITY_SCORE: 16
ADLS_ACUITY_SCORE: 15

## 2025-03-27 NOTE — PROGRESS NOTES
"GASTROENTEROLOGY PROGRESS NOTE    SUBJECTIVE:  Feeling better, has not required any pain medication in the last 8 hours.  He would like to go home today.  He tolerated a full liquid diet without any discomfort.  He will try low-fat diet for lunch.    OBJECTIVE:    /79   Pulse 86   Temp 98.8  F (37.1  C) (Oral)   Resp 18   Ht 1.778 m (5' 10\")   Wt 100.4 kg (221 lb 6.4 oz)   SpO2 95%   BMI 31.77 kg/m    Temp (24hrs), Av.9  F (37.2  C), Min:98.7  F (37.1  C), Max:99.1  F (37.3  C)    No data found.    Intake/Output Summary (Last 24 hours) at 3/27/2025 07  Last data filed at 3/27/2025 0057  Gross per 24 hour   Intake 483 ml   Output --   Net 483 ml         PHYSICAL EXAM    Constitutional: NAD, comfortable  Cardiovascular: RRR, normal S1, S2   Respiratory: CTAB  Abdomen: soft, non-tender, nondistended        Additional Comments:  ROS, FH, SH: See initial GI consult for details.    I have reviewed the patient's new clinical lab results:    Recent Labs   Lab Test 25  0536 25  0808 25  0709   WBC 9.5 13.1* 17.3*   HGB 13.9 13.3 14.8   MCV 85 86 86    152 152     Recent Labs   Lab Test 25  0536 25  0709 25  0649   * 132* 138   POTASSIUM 3.7 3.9 4.3   CHLORIDE 99 98 104   CO2    BUN 13.3 17.5 14.8   CR 0.94 1.01 1.01   ANIONGAP 10 11 10   WAQAS 9.1 8.9 9.0     Recent Labs   Lab Test 25  0709 25  0649 03/23/25  2025 10/14/24  1041 22  2319   ALBUMIN 3.6 3.8 4.4   < > 4.4   BILITOTAL 0.8 0.5 0.4   < > 0.8   ALT 18 25 29   < > 31   AST 14 17 20   < > 28   ALKPHOS 66 69 77   < > 78   LIPASE  --  266* 666*  --  631*    < > = values in this interval not displayed.         A/P  Idiopathic pancreatitis, improved.  He will try a low-fat diet for lunch, if he tolerates this well which I suspect he will, then he can go home today.  My office will contact him to follow-up in our pancreas clinic with Dr. Sena  Signing off but please call with any " questions or concerns.      Nice Fina Mittal MD  [facility]

## 2025-03-27 NOTE — PLAN OF CARE
"Goal Outcome Evaluation: [id number]    Overall Patient Progress: improvingOverall Patient Progress: improving    A&Ox4. VSS. Pt rating epigastric pain 5/10. PRN oxycodone and tylenol x1 given for pain control. Pt denies N/V. Tolerating full liquid diet. Voiding. Independent in room.   Problem: Adult Inpatient Plan of Care  Goal: Plan of Care Review  Description: The Plan of Care Review/Shift note should be completed every shift.  The Outcome Evaluation is a brief statement about your assessment that the patient is improving, declining, or no change.  This information will be displayed automatically on your shiftnote.  3/27/2025 0122 by Reese Reyna RN  Outcome: Progressing  Flowsheets (Taken 3/27/2025 0122)  Plan of Care Reviewed With: patient  3/27/2025 0122 by Reese Reyna RN  Outcome: Progressing  Flowsheets (Taken 3/27/2025 0122)  Plan of Care Reviewed With: patient  Overall Patient Progress: improving  Goal: Patient-Specific Goal (Individualized)  Description: You can add care plan individualizations to a care plan. Examples of Individualization might be:  \"Parent requests to be called daily at 9am for status\", \"I have a hard time hearing out of my right ear\", or \"Do not touch me to wake me up as it startlesme\".  3/27/2025 0122 by Reese Reyna RN  Outcome: Progressing  3/27/2025 0122 by Reese Reyna RN  Outcome: Progressing  Goal: Absence of Hospital-Acquired Illness or Injury  3/27/2025 0122 by Reese Reyna RN  Outcome: Progressing  3/27/2025 0122 by Reese Reyna RN  Outcome: Progressing  Intervention: Identify and Manage Fall Risk  Recent Flowsheet Documentation  Taken 3/26/2025 2100 by Reese Reyna RN  Safety Promotion/Fall Prevention:   assistive device/personal items within reach   clutter free environment maintained   nonskid shoes/slippers when out of bed   room organization consistent   safety round/check completed  Intervention: Prevent Skin Injury  Recent Flowsheet " Documentation  Taken 3/26/2025 2100 by Reese Reyna RN  Body Position: position changed independently  Skin Protection:   adhesive use limited   tubing/devices free from skin contact  Intervention: Prevent Infection  Recent Flowsheet Documentation  Taken 3/26/2025 2100 by Reese Reyna RN  Infection Prevention:   equipment surfaces disinfected   hand hygiene promoted   rest/sleep promoted   single patient room provided  Goal: Optimal Comfort and Wellbeing  3/27/2025 0122 by Reese Reyna RN  Outcome: Progressing  3/27/2025 0122 by Reese Reyna RN  Outcome: Progressing  Intervention: Monitor Pain and Promote Comfort  Recent Flowsheet Documentation  Taken 3/26/2025 2100 by Reese Reyna RN  Pain Management Interventions: medication (see MAR)  Goal: Readiness for Transition of Care  3/27/2025 0122 by Reese Reyna RN  Outcome: Progressing  3/27/2025 0122 by Reese Reyna RN  Outcome: Progressing     Problem: Pain Acute  Goal: Optimal Pain Control and Function  3/27/2025 0122 by Reese Reyna RN  Outcome: Progressing  3/27/2025 0122 by Reese Reyna RN  Outcome: Progressing  Intervention: Develop Pain Management Plan  Recent Flowsheet Documentation  Taken 3/26/2025 2100 by Reese Reyna RN  Pain Management Interventions: medication (see MAR)  Intervention: Prevent or Manage Pain  Recent Flowsheet Documentation  Taken 3/26/2025 2100 by Reese Reyna RN  Medication Review/Management:   medications reviewed   high-risk medications identified     Problem: Pancreatitis  Goal: Fluid and Electrolyte Balance  3/27/2025 0122 by Reese Reyna RN  Outcome: Progressing  3/27/2025 0122 by Reese Reyna RN  Outcome: Progressing  Goal: Absence of Infection Signs and Symptoms  3/27/2025 0122 by Reese Reyna RN  Outcome: Progressing  3/27/2025 0122 by Reese Reyna RN  Outcome: Progressing  Goal: Optimal Nutrition Delivery  3/27/2025 0122 by Reese Reyna RN  Outcome: Progressing  3/27/2025 0122 by  Maribell, Sulekha H, RN  Outcome: Progressing  Goal: Optimal Pain Control and Function  3/27/2025 0122 by Reese Reyna RN  Outcome: Progressing  3/27/2025 0122 by Reese Reyna RN  Outcome: Progressing  Intervention: Monitor and Manage Pain  Recent Flowsheet Documentation  Taken 3/26/2025 2100 by Reese Reyna RN  Pain Management Interventions: medication (see MAR)  Goal: Effective Oxygenation and Ventilation  3/27/2025 0122 by Reese Reyna RN  Outcome: Progressing  3/27/2025 0122 by Reese Reyna RN  Outcome: Progressing  Intervention: Optimize Oxygenation and Ventilation  Recent Flowsheet Documentation  Taken 3/26/2025 2100 by Reese Reyna RN  Activity Management: activity adjusted per tolerance  Head of Bed (HOB) Positioning: HOB at 20-30 degrees

## 2025-03-27 NOTE — DISCHARGE SUMMARY
"[facility]  Hospitalist Discharge Summary      Date of Admission:  3/23/2025  Date of Discharge:  3/27/2025  Discharging Provider: Lindsey Schofield MD  Discharge Service: Hospitalist Service    Discharge Diagnoses   Recurrent acute idiopathic pancreatitis    Clinically Significant Risk Factors     # Obesity: Estimated body mass index is 31.77 kg/m  as calculated from the following:    Height as of this encounter: 1.778 m (5' 10\").    Weight as of this encounter: 100.4 kg (221 lb 6.4 oz).       Follow-ups Needed After Discharge   Follow-up Appointments       Follow Up      Follow up with Dr. Sena at [facility], within 1 month.                Unresulted Labs Ordered in the Past 30 Days of this Admission       No orders found from 2/21/2025 to 3/24/2025.            Discharge Disposition   Discharged to home  Condition at discharge: Stable    Hospital Course     Summary of Stay: Deandre Lemus is a 45 year old male with a history of an episode of acute pancreatitis in 9/2022 deemed idiopathic admitted on 3/23/2025 with recurrent episode of pancreatitis      First episode of pancreatitis 8/2022.  Deemed idiopathic.  No e/o significant alcohol use, no biliary issue identified.  Post pancreatitis underwent EUS and noted to have mass at GE junction ->EGD with bx consistent with gastric leiomyoma (benign).  MRCP negative as well.  He has not had any alcohol since that time.  Per [facility] note in follow-up 11/2022, felt could just be an isolated episode but if it recurs would recommend w/up for genetic causes.      He came into the ER with sx similar to his last episode.  Epigastric pain radiating to his back with associated nausea but no vomiting.  Sx were fairly mild prior to dinner last night but afterwards the pain and nausea was significantly worse. There was some radiation to the back. No fevers or chills.  Decided to come in as he felt that this was another episode of pancreatitis      Problem List: "   Recurrent acute pancreatitis   Previously felt to be idiopathic.  He has not had any alcohol since his initial dx in 2022. Denies any trauma.  No medications.  Presented with epigastric pain radiating to back, associated with elevated lipase and CT evidence of peripancreatic inflammatory stranding about the neck, head, and the uncinate process of pancreas, compatible with acute interstitial pancreatitis.  Patient was made n.p.o. and started on IV fluids and pain management.  Patient reported improvement in pain.  GI consulted, appreciated recommendations.  Recommended follow-up with Dr. Sena from [facility] for genetic causes.      - Low-fat diet upon discharge  - Pain management with Tylenol, p.o. oxycodone 5 mg every 6 hours as needed, a prescription for 20 pills were provided upon discharge        Fever and leukocytosis-resolved  Patient was noted to have fever around 100.6, and WBC elevated at 17. Over all review of system is fairly negative except for epigastric abdominal pain which appears to be improving and related to pancreatitis.  Suspect fever and leukocytosis are SIRS response in the setting of acute pancreatitis rather than acute infection.  This was resolved prior to discharge and its own and patient did not require any antibiotics.       Hx of gastric leiomyoma--this was biopsy-prove.  Benign and was advised that no further follow-up was recommended.      Mild thickening at the GE junction  This was noted at the CT abdomen and pelvis.  Per radiology report this may just represent decompressed bowel however esophagogastritis or a recurrent mass in the region are not excluded.  Suspect this is patient's prior leiomyoma.  Advised patient to discuss this with [facility] when he follows up pancreatitis issues.  I doubt that they will to further workup like endoscopy    Consultations This Hospital Stay   GASTROENTEROLOGY IP CONSULT    Code Status   Full Code    Time Spent on this Encounter   I, Lindsey Schofield MD,  personally saw the patient today and spent greater than 30 minutes discharging this patient.       Lindsey Schofield MD  [facility]  [address] [facility] [address]  Phone: [phone]  Fax: [phone]  ______________________________________________________________________    Physical Exam   Vital Signs: Temp: 98.3  F (36.8  C) Temp src: Oral BP: 115/72 Pulse: 73   Resp: 18 SpO2: 94 % O2 Device: None (Room air)    Weight: 221 lbs 6.4 oz    Constitutional: awake, alert, cooperative, no apparent distress, and appears stated age  Eyes: Anicteric sclera  ENT: normocephalic, without obvious abnormality  Respiratory: On room air, equal air entry bilaterally, no wheezing or crackles  Cardiovascular: Normal rate, regular rhythm, no murmur  GI: no epigastric tenderness, soft, nondistended abdomen  Musculoskeletal: no lower extremity pitting edema present  Neurologic: Awake, alert, oriented x 3  Neuropsychiatric: Appropriate mood and affect       Primary Care Physician   Deirdre E. Milligan    Discharge Orders      Activity    Your activity upon discharge: activity as tolerated     Follow Up    Follow up with Dr. Sena at [facility], within 1 month.     Reason for your hospital stay    Acute Idiopathic Pancreatitis       Dear Greg,     You were admitted to the hospital with acute pancreatitis of unclear etiology.  During the course of hospital stay you received IV fluids, pain medications, and bowel rest.  You were evaluated by GI and they recommended that you should follow-up with Dr. Sena outpatient to pursue genetic studies for pancreatitis.  During the course of hospital stay your condition improved and you were able to tolerate low-fat diet.  Please continue low-fat diet for several days after discharge from the hospital and slowly advance to regular diet as you are able to tolerate.  Please take pain medication as needed.  If you feel like your pain is coming back or pain medications are  not helping please seek further medical care.  If you notice any fever, chills, nausea, vomiting, unable to tolerate food please seek further medical care.    It was a pleasure taking care of you.  Good luck!    Lindsey Schofield MD     Diet    Follow this diet upon discharge: Current Diet:Orders Placed This Encounter      Low Fat Diet (up to 50g)       Significant Results and Procedures   Most Recent 3 CBC's:  Recent Labs   Lab Test 03/27/25  0536 03/26/25  0808 03/25/25  0709   WBC 9.5 13.1* 17.3*   HGB 13.9 13.3 14.8   MCV 85 86 86    152 152     Most Recent 3 BMP's:  Recent Labs   Lab Test 03/27/25  0536 03/25/25  0709 03/24/25  0649   * 132* 138   POTASSIUM 3.7 3.9 4.3   CHLORIDE 99 98 104   CO2 25 23 24   BUN 13.3 17.5 14.8   CR 0.94 1.01 1.01   ANIONGAP 10 11 10   WAQAS 9.1 8.9 9.0   * 127* 127*       Discharge Medications   Current Discharge Medication List        START taking these medications    Details   oxyCODONE (ROXICODONE) 5 MG tablet Take 1 tablet (5 mg) by mouth every 6 hours as needed for pain.  Qty: 20 tablet, Refills: 0    Associated Diagnoses: Acute pancreatitis, unspecified complication status, unspecified pancreatitis type           Allergies   No Known Allergies

## 2025-03-27 NOTE — PLAN OF CARE
"Patient is A&Ox4, ambulates independently. Epigastric pain managed w/ PRN pain meds. Tolerating FLD w/ plans to advance today.     Goal Outcome Evaluation:      Plan of Care Reviewed With: patient    Overall Patient Progress: improvingOverall Patient Progress: improving    Outcome Evaluation: Advance diet as tolerated. Pain mgmt.      Problem: Adult Inpatient Plan of Care  Goal: Plan of Care Review  Description: The Plan of Care Review/Shift note should be completed every shift.  The Outcome Evaluation is a brief statement about your assessment that the patient is improving, declining, or no change.  This information will be displayed automatically on your shiftnote.  3/27/2025 0412 by Jeannette Mayes RN  Outcome: Progressing  Flowsheets  Taken 3/27/2025 0412  Outcome Evaluation: Advance diet as tolerated. Pain mgmt.  Plan of Care Reviewed With: patient  Overall Patient Progress: improving  Taken 3/27/2025 0114  Outcome Evaluation: Tolerating FLD. Pain managed w/ PRN pain medicaitons. Plan to advance diet.  Plan of Care Reviewed With: patient  Overall Patient Progress: improving  3/27/2025 0114 by Jeannette Mayes RN  Outcome: Progressing  Flowsheets (Taken 3/27/2025 0114)  Outcome Evaluation: Tolerating FLD. Pain managed w/ PRN pain medicaitons. Plan to advance diet.  Plan of Care Reviewed With: patient  Overall Patient Progress: improving  Goal: Patient-Specific Goal (Individualized)  Description: You can add care plan individualizations to a care plan. Examples of Individualization might be:  \"Parent requests to be called daily at 9am for status\", \"I have a hard time hearing out of my right ear\", or \"Do not touch me to wake me up as it startlesme\".  3/27/2025 0412 by Jeannette Mayes RN  Outcome: Progressing  3/27/2025 0114 by Jeannette Mayes RN  Outcome: Progressing  Goal: Absence of Hospital-Acquired Illness or Injury  3/27/2025 0412 by Jeannette Mayes, RN  Outcome: Progressing  3/27/2025 0114 by Gerber, " Jeannette SZYMANSKI RN  Outcome: Progressing  Intervention: Identify and Manage Fall Risk  Recent Flowsheet Documentation  Taken 3/27/2025 0057 by Jeannette Mayes RN  Safety Promotion/Fall Prevention:   assistive device/personal items within reach   clutter free environment maintained   nonskid shoes/slippers when out of bed   room organization consistent   safety round/check completed  Intervention: Prevent Skin Injury  Recent Flowsheet Documentation  Taken 3/27/2025 0057 by Jeannette Mayes RN  Body Position: position changed independently  Skin Protection:   adhesive use limited   tubing/devices free from skin contact  Intervention: Prevent Infection  Recent Flowsheet Documentation  Taken 3/27/2025 0057 by Jeannette Mayes RN  Infection Prevention:   equipment surfaces disinfected   hand hygiene promoted   rest/sleep promoted   single patient room provided  Goal: Optimal Comfort and Wellbeing  3/27/2025 0412 by Jeannette Mayes RN  Outcome: Progressing  3/27/2025 0114 by Jeannette Mayes RN  Outcome: Progressing  Goal: Readiness for Transition of Care  3/27/2025 0412 by Jeannette Mayes RN  Outcome: Progressing  3/27/2025 0114 by Jeannette Mayes RN  Outcome: Progressing     Problem: Pancreatitis  Goal: Fluid and Electrolyte Balance  3/27/2025 0412 by Jeannette Mayes RN  Outcome: Progressing  3/27/2025 0114 by Jeannette Mayes RN  Outcome: Progressing  Goal: Absence of Infection Signs and Symptoms  3/27/2025 0412 by Jeannette Mayes RN  Outcome: Progressing  3/27/2025 0114 by Jeannette Mayes RN  Outcome: Progressing  Goal: Optimal Nutrition Delivery  3/27/2025 0412 by Jeannette Mayes RN  Outcome: Progressing  3/27/2025 0114 by Jeannette Mayes RN  Outcome: Progressing  Goal: Optimal Pain Control and Function  3/27/2025 0412 by Jeannette Mayes RN  Outcome: Progressing  3/27/2025 0114 by Jeannette aMyes RN  Outcome: Progressing  Goal: Effective Oxygenation and Ventilation  3/27/2025 0412 by Jeannette Mayes RN  Outcome:  Progressing  3/27/2025 0114 by Jeannette Mayes RN  Outcome: Progressing  Intervention: Optimize Oxygenation and Ventilation  Recent Flowsheet Documentation  Taken 3/27/2025 0057 by Jeannette Mayes RN  Activity Management: activity adjusted per tolerance  Head of Bed (HOB) Positioning: HOB at 20-30 degrees     Problem: Pain Acute  Goal: Optimal Pain Control and Function  3/27/2025 0412 by Jeannette Mayes RN  Outcome: Progressing  3/27/2025 0114 by Jeannette Mayes RN  Outcome: Progressing  Intervention: Prevent or Manage Pain  Recent Flowsheet Documentation  Taken 3/27/2025 0057 by Jeannette Mayes RN  Medication Review/Management:   medications reviewed   high-risk medications identified

## 2025-03-27 NOTE — PROGRESS NOTES
Patient's After Visit Summary was reviewed with patient.   Patient verbalized understanding of After Visit Summary, recommended follow up and was given an opportunity to ask questions.   Discharge medications sent home with patient/family: YES   Discharged with spouse

## 2025-04-18 PROBLEM — K76.0 METABOLIC DYSFUNCTION-ASSOCIATED STEATOTIC LIVER DISEASE (MASLD): Status: ACTIVE | Noted: 2025-04-18

## 2025-04-18 PROBLEM — K85.90 RECURRENT ACUTE PANCREATITIS: Status: ACTIVE | Noted: 2025-04-18

## 2025-04-18 PROBLEM — K85.90 ACUTE PANCREATITIS: Status: RESOLVED | Noted: 2022-10-04 | Resolved: 2025-04-18

## 2025-04-18 PROBLEM — R10.13 EPIGASTRIC PAIN: Status: RESOLVED | Noted: 2025-03-23 | Resolved: 2025-04-18

## 2025-04-18 PROBLEM — K85.90 ACUTE PANCREATITIS, UNSPECIFIED COMPLICATION STATUS, UNSPECIFIED PANCREATITIS TYPE: Status: RESOLVED | Noted: 2025-03-23 | Resolved: 2025-04-18

## 2025-04-23 ENCOUNTER — PATIENT OUTREACH (OUTPATIENT)
Dept: CARE COORDINATION | Facility: CLINIC | Age: 45
End: 2025-04-23
Payer: COMMERCIAL

## 2025-04-24 ENCOUNTER — MEDICAL CORRESPONDENCE (OUTPATIENT)
Dept: HEALTH INFORMATION MANAGEMENT | Facility: CLINIC | Age: 45
End: 2025-04-24
Payer: COMMERCIAL

## 2025-04-29 ENCOUNTER — TRANSCRIBE ORDERS (OUTPATIENT)
Dept: OTHER | Age: 45
End: 2025-04-29

## 2025-05-05 ENCOUNTER — OFFICE VISIT (OUTPATIENT)
Dept: PEDIATRICS | Facility: CLINIC | Age: 45
End: 2025-05-05
Payer: COMMERCIAL

## 2025-05-05 VITALS
BODY MASS INDEX: 30.03 KG/M2 | SYSTOLIC BLOOD PRESSURE: 115 MMHG | OXYGEN SATURATION: 98 % | HEIGHT: 70 IN | HEART RATE: 72 BPM | WEIGHT: 209.8 LBS | DIASTOLIC BLOOD PRESSURE: 74 MMHG | TEMPERATURE: 98.4 F | RESPIRATION RATE: 16 BRPM

## 2025-05-05 DIAGNOSIS — Z01.818 PREOP GENERAL PHYSICAL EXAM: Primary | ICD-10-CM

## 2025-05-05 DIAGNOSIS — K85.90 RECURRENT ACUTE PANCREATITIS: ICD-10-CM

## 2025-05-05 PROBLEM — R73.03 PREDIABETES: Status: ACTIVE | Noted: 2025-05-05

## 2025-05-05 PROCEDURE — 99213 OFFICE O/P EST LOW 20 MIN: CPT | Performed by: STUDENT IN AN ORGANIZED HEALTH CARE EDUCATION/TRAINING PROGRAM

## 2025-05-05 PROCEDURE — 3078F DIAST BP <80 MM HG: CPT | Performed by: STUDENT IN AN ORGANIZED HEALTH CARE EDUCATION/TRAINING PROGRAM

## 2025-05-05 PROCEDURE — 1126F AMNT PAIN NOTED NONE PRSNT: CPT | Performed by: STUDENT IN AN ORGANIZED HEALTH CARE EDUCATION/TRAINING PROGRAM

## 2025-05-05 PROCEDURE — 3074F SYST BP LT 130 MM HG: CPT | Performed by: STUDENT IN AN ORGANIZED HEALTH CARE EDUCATION/TRAINING PROGRAM

## 2025-05-05 ASSESSMENT — PAIN SCALES - GENERAL: PAINLEVEL_OUTOF10: NO PAIN (0)

## 2025-05-05 NOTE — PATIENT INSTRUCTIONS
How to Take Your Medication Before Surgery  Preoperative Medication Instructions   Antiplatelet or Anticoagulation Medication Instructions   - We reviewed the medication list and the patient is not on an antiplatelet or anticoagulation medications.    Additional Medication Instructions  We reviewed the medication list and there are no chronic medications that need to be adjusted for this procedure.       Patient Education   Preparing for Your Surgery  For Adults  Getting started  In most cases, a nurse will call to review your health history and instructions. They will give you an arrival time based on your scheduled surgery time. Please be ready to share:  Your doctor's clinic name and phone number  Your medical, surgical, and anesthesia history  A list of allergies and sensitivities  A list of medicines, including herbal treatments and over-the-counter drugs  Whether the patient has a legal guardian (ask how to send us the papers in advance)  Note: You may not receive a call if you were seen at our PAC (Preoperative Assessment Center).  Please tell us if you're pregnant--or if there's any chance you might be pregnant. Some surgeries may injure a fetus (unborn baby), so they require a pregnancy test. Surgeries that are safe for a fetus don't always need a test, and you can choose whether to have one.   Preparing for surgery  Within 10 to 30 days of surgery: Have a pre-op exam (sometimes called an H&P, or History and Physical). This can be done at a clinic or pre-operative center.  If you're having a , you may not need this exam. Talk to your care team.  At your pre-op exam, talk to your care team about all medicines you take. (This includes CBD oil and any drugs, such as THC, marijuana, and other forms of cannabis.) If you need to stop any medicine before surgery, ask when to start taking it again.  This is for your safety. Many medicines and drugs can make you bleed too much during surgery. Some change  how well surgery (anesthesia) drugs work.  Call your insurance company to let them know you're having surgery. (If you don't have insurance, call [phone].)  Call your clinic if there's any change in your health. This includes a scrape or scratch near the surgery site, or any signs of a cold (sore throat, runny nose, cough, rash, fever).  Eating and drinking guidelines  For your safety: Unless your surgeon tells you otherwise, follow the guidelines below.  Eat and drink as normal until 8 hours before you arrive for surgery. After that, no food or milk. You can spit out gum when you arrive.  Drink clear liquids until 2 hours before you arrive. These are liquids you can see through, like water, Gatorade, and Propel Water. They also include plain black coffee and tea (no cream or milk).  No alcohol for 24 hours before you arrive. The night before surgery, stop any drinks that contain THC.  If your care team tells you to take medicine on the morning of surgery, it's okay to take it with a sip of water. No other medicines or drugs are allowed (including CBD oil)--follow your care team's instructions.  If you have questions the day of surgery, call your hospital or surgery center.   Preventing infection  Shower or bathe the night before and the morning of surgery. Follow the instructions your clinic gave you. (If no instructions, use regular soap.)  Don't shave or clip hair near your surgery site. We'll remove the hair if needed.  Don't smoke or vape the morning of surgery. No chewing tobacco for 6 hours before you arrive. A nicotine patch is okay. You may spit out nicotine gum when you arrive.  For some surgeries, the surgeon will tell you to fully quit smoking and nicotine.  We will make every effort to keep you safe from infection. We will:  Clean our hands often with soap and water (or an alcohol-based hand rub).  Clean the skin at your surgery site with a special soap that kills germs.  Give you a special gown to  keep you warm. (Cold raises the risk of infection.)  Wear hair covers, masks, gowns, and gloves during surgery.  Give antibiotic medicine, if prescribed. Not all surgeries need this medicine.  What to bring on the day of surgery  Photo ID and insurance card  Copy of your health care directive, if you have one  Glasses and hearing aids (bring cases)  You can't wear contacts during surgery  Inhaler and eye drops, if you use them (tell us about these when you arrive)  CPAP machine or breathing device, if you use them  A few personal items, if spending the night  If you have . . .  A pacemaker, ICD (cardiac defibrillator), or other implant: Bring the ID card.  An implanted stimulator: Bring the remote control.  A legal guardian: Bring a copy of the certified (court-stamped) guardianship papers.  Please remove any jewelry, including body piercings. Leave jewelry and other valuables at home.  If you're going home the day of surgery  You must have a responsible adult drive you home. They should stay with you overnight as well.  If you don't have someone to stay with you, and you aren't safe to go home alone, we may keep you overnight. Insurance often won't pay for this.  After surgery  If it's hard to control your pain or you need more pain medicine, please call your surgeon's office.  Questions?   If you have any questions for your care team, list them here:   ____________________________________________________________________________________________________________________________________________________________________________________________________________________________________________________________  For informational purposes only. Not to replace the advice of your health care provider. Copyright   2003, 2019 [facility]. All rights reserved. Clinically reviewed by Javon Auguste MD. [employer] [id number] - REV 08/24.

## 2025-05-05 NOTE — PROGRESS NOTES
Preoperative Evaluation  [facility] [place]  [address]  [address]  [place] [address]  Phone: [phone]  Fax: [phone]  Primary Provider: Deirdre E. Milligan, MD  Pre-op Performing Provider: Deirdre E. Milligan, MD  May 5, 2025             4/28/2025   Surgical Information   What procedure is being done? EUS   Facility or Hospital where procedure/surgery will be performed: [facility]   Who is doing the procedure / surgery? Zuhair Crowley MD   Date of surgery / procedure: 5/15/2025   Time of surgery / procedure: 0800   Where do you plan to recover after surgery? at home with family     Fax number for surgical facility: Note does not need to be faxed, will be available electronically in Epic.    Assessment & Plan     The proposed surgical procedure is considered INTERMEDIATE risk.    Preop general physical exam  Medically optimized for procedure.    Recurrent acute pancreatitis  Getting EUS for further work up. Reports has genetics appointment as well.            - No identified additional risk factors other than previously addressed    Preoperative Medication Instructions  Antiplatelet or Anticoagulation Medication Instructions   - We reviewed the medication list and the patient is not on an antiplatelet or anticoagulation medications.    Additional Medication Instructions  We reviewed the medication list and there are no chronic medications that need to be adjusted for this procedure.    Recommendation  Approval given to proceed with proposed procedure, without further diagnostic evaluation.        Subjective   Greg is a 45 year old, presenting for the following:  Pre-Op Exam          5/5/2025    10:37 AM   Additional Questions   Roomed by AY         5/5/2025    10:37 AM   Patient Reported Additional Medications   Patient reports taking the following new medications none     HPI: 45 year old generally healthy male with hepatic steatosis on  imaging and 2 prior episodes of idiopathic pancreatitis requiring hospitalizations.  After first episode in 2022 was found to have gastric leiomyoma (benign). Recent second episode of pancreatitis, and has been following with [facility]. Will be getting endoscopic ultrasound.          4/28/2025   Pre-Op Questionnaire   Have you ever had a heart attack or stroke? No   Have you ever had surgery on your heart or blood vessels, such as a stent placement, a coronary artery bypass, or surgery on an artery in your head, neck, heart, or legs? No   Do you have chest pain with activity? No   Do you have a history of heart failure? No   Do you currently have a cold, bronchitis or symptoms of other infection? No   Do you have a cough, shortness of breath, or wheezing? No   Do you or anyone in your family have previous history of blood clots? No   Do you or does anyone in your family have a serious bleeding problem such as prolonged bleeding following surgeries or cuts? No   Have you ever had problems with anemia or been told to take iron pills? No   Have you had any abnormal blood loss such as black, tarry or bloody stools? No   Have you ever had a blood transfusion? No   Are you willing to have a blood transfusion if it is medically needed before, during, or after your surgery? Yes   Have you or any of your relatives ever had problems with anesthesia? No   Do you have sleep apnea, excessive snoring or daytime drowsiness? No   Do you have any artifical heart valves or other implanted medical devices like a pacemaker, defibrillator, or continuous glucose monitor? No   Do you have artificial joints? No   Are you allergic to latex? (!) YES       Preoperative Review of    reviewed - no record of controlled substances prescribed.          Patient Active Problem List    Diagnosis Date Noted    Recurrent acute pancreatitis 04/18/2025     Priority: Medium     Following with [facility]  2 hospitalizations,  "last 2025      Metabolic dysfunction-associated steatotic liver disease (MASLD) 2025     Priority: Medium      Past Medical History:   Diagnosis Date    Closed fracture of left hip with routine healing         Gastric leiomyoma     Idiopathic acute pancreatitis     RBBB (right bundle branch block)      Past Surgical History:   Procedure Laterality Date    BIOPSY  10/31/2023    Biopsy of Esophageal Mass    EYE SURGERY  2007    PRK    VASECTOMY      WISDOM TOOTH EXTRACTION       No current outpatient medications on file.       No Known Allergies     Social History     Tobacco Use    Smoking status: Former     Current packs/day: 0.00     Average packs/day: 1 pack/day for 14.4 years (14.4 ttl pk-yrs)     Types: Cigarettes, Other     Start date: 1997     Quit date: 2011     Years since quittin.3     Passive exposure: Never    Smokeless tobacco: Former     Quit date: 2021    Tobacco comments:     Former user; no interest in using again   Substance Use Topics    Alcohol use: Not Currently     Comment: Quit approx 2022; no desire to continue       History   Drug Use Unknown               Objective    /74 (BP Location: Right arm, Patient Position: Sitting, Cuff Size: Adult Large)   Pulse 72   Temp 98.4  F (36.9  C) (Temporal)   Resp 16   Ht 1.778 m (5' 10\")   Wt 95.2 kg (209 lb 12.8 oz)   SpO2 98%   BMI 30.10 kg/m     Estimated body mass index is 30.1 kg/m  as calculated from the following:    Height as of this encounter: 1.778 m (5' 10\").    Weight as of this encounter: 95.2 kg (209 lb 12.8 oz).  Physical Exam  GENERAL: alert and no distress  EYES: Eyes grossly normal to inspection, and conjunctivae and sclerae normal  HENT: ear canals and TM's normal, nose and mouth without ulcers or lesions  NECK: no adenopathy, no asymmetry, masses, or scars  RESP: lungs clear to auscultation - no rales, rhonchi or wheezes  CV: regular rate and rhythm, normal S1 S2, no S3 or S4, " no murmur, click or rub, no peripheral edema  ABDOMEN: soft, nontender, no hepatosplenomegaly, no masses  MS: no gross musculoskeletal defects noted, no edema  SKIN: no suspicious lesions or rashes  NEURO: Normal strength and tone, mentation intact and speech normal  PSYCH: mentation appears normal, affect normal/bright    Recent Labs   Lab Test 04/18/25  1358 03/27/25  0536 03/26/25  0808 03/23/25  2025 10/14/24  1041   HGB  --  13.9 13.3   < > 16.6   PLT  --  166 152   < > 242    134*  --    < > 136   POTASSIUM 4.4 3.7  --    < > 4.4   CR 1.00 0.94  --    < > 1.07   A1C 5.8*  --   --   --  6.0*    < > = values in this interval not displayed.        Diagnostics  No labs were ordered during this visit.   No EKG required, no history of coronary heart disease, significant arrhythmia, peripheral arterial disease or other structural heart disease.    Revised Cardiac Risk Index (RCRI)  The patient has the following serious cardiovascular risks for perioperative complications:   - No serious cardiac risks = 0 points     RCRI Interpretation: 0 points: Class I (very low risk - 0.4% complication rate)         Signed Electronically by: Deirdre E. Milligan, MD  A copy of this evaluation report is provided to the requesting physician.

## 2025-05-12 ENCOUNTER — TRANSCRIBE ORDERS (OUTPATIENT)
Dept: OTHER | Age: 45
End: 2025-05-12

## 2025-05-12 DIAGNOSIS — K86.89 FATTY PANCREAS: Primary | ICD-10-CM

## 2025-05-12 DIAGNOSIS — Z87.19 HX OF ACUTE PANCREATITIS: ICD-10-CM

## 2025-05-15 ENCOUNTER — TRANSFERRED RECORDS (OUTPATIENT)
Dept: SURGERY | Facility: CLINIC | Age: 45
End: 2025-05-15
Payer: COMMERCIAL

## 2025-06-02 ENCOUNTER — HOSPITAL ENCOUNTER (OUTPATIENT)
Facility: CLINIC | Age: 45
Discharge: HOME | End: 2025-06-02
Attending: SURGERY | Admitting: SURGERY
Payer: COMMERCIAL

## 2025-06-02 ENCOUNTER — ANESTHESIA (OUTPATIENT)
Dept: SURGERY | Facility: CLINIC | Age: 45
End: 2025-06-02
Payer: COMMERCIAL

## 2025-06-02 ENCOUNTER — ANESTHESIA EVENT (OUTPATIENT)
Dept: SURGERY | Facility: CLINIC | Age: 45
End: 2025-06-02
Payer: COMMERCIAL

## 2025-06-02 VITALS
BODY MASS INDEX: 30.71 KG/M2 | SYSTOLIC BLOOD PRESSURE: 123 MMHG | WEIGHT: 214.5 LBS | RESPIRATION RATE: 15 BRPM | HEIGHT: 70 IN | HEART RATE: 71 BPM | TEMPERATURE: 97.7 F | DIASTOLIC BLOOD PRESSURE: 82 MMHG | OXYGEN SATURATION: 99 %

## 2025-06-02 DIAGNOSIS — K83.8 BILIARY SLUDGE: ICD-10-CM

## 2025-06-02 PROCEDURE — 250N000011 HC RX IP 250 OP 636: Performed by: SURGERY

## 2025-06-02 PROCEDURE — 710N000009 HC RECOVERY PHASE 1, LEVEL 1, PER MIN: Performed by: SURGERY

## 2025-06-02 PROCEDURE — 250N000011 HC RX IP 250 OP 636: Mod: JZ | Performed by: ANESTHESIOLOGY

## 2025-06-02 PROCEDURE — 258N000003 HC RX IP 258 OP 636: Performed by: ANESTHESIOLOGY

## 2025-06-02 PROCEDURE — 370N000017 HC ANESTHESIA TECHNICAL FEE, PER MIN: Performed by: SURGERY

## 2025-06-02 PROCEDURE — 250N000013 HC RX MED GY IP 250 OP 250 PS 637: Performed by: SURGERY

## 2025-06-02 PROCEDURE — 47562 LAPAROSCOPIC CHOLECYSTECTOMY: CPT | Performed by: SURGERY

## 2025-06-02 PROCEDURE — 47562 LAPAROSCOPIC CHOLECYSTECTOMY: CPT | Mod: AS | Performed by: PHYSICIAN ASSISTANT

## 2025-06-02 PROCEDURE — 999N000141 HC STATISTIC PRE-PROCEDURE NURSING ASSESSMENT: Performed by: SURGERY

## 2025-06-02 PROCEDURE — 250N000011 HC RX IP 250 OP 636: Performed by: NURSE ANESTHETIST, CERTIFIED REGISTERED

## 2025-06-02 PROCEDURE — 250N000009 HC RX 250: Performed by: SURGERY

## 2025-06-02 PROCEDURE — 250N000009 HC RX 250: Performed by: NURSE ANESTHETIST, CERTIFIED REGISTERED

## 2025-06-02 PROCEDURE — 258N000003 HC RX IP 258 OP 636: Performed by: NURSE ANESTHETIST, CERTIFIED REGISTERED

## 2025-06-02 PROCEDURE — 710N000012 HC RECOVERY PHASE 2, PER MINUTE: Performed by: SURGERY

## 2025-06-02 PROCEDURE — 258N000001 HC RX 258: Performed by: SURGERY

## 2025-06-02 PROCEDURE — 272N000001 HC OR GENERAL SUPPLY STERILE: Performed by: SURGERY

## 2025-06-02 PROCEDURE — 88304 TISSUE EXAM BY PATHOLOGIST: CPT | Mod: TC | Performed by: SURGERY

## 2025-06-02 PROCEDURE — 250N000013 HC RX MED GY IP 250 OP 250 PS 637: Performed by: ANESTHESIOLOGY

## 2025-06-02 PROCEDURE — 250N000025 HC SEVOFLURANE, PER MIN: Performed by: SURGERY

## 2025-06-02 PROCEDURE — 88304 TISSUE EXAM BY PATHOLOGIST: CPT | Mod: 26 | Performed by: PATHOLOGY

## 2025-06-02 PROCEDURE — 360N000083 HC SURGERY LEVEL 3 W/ FLUORO, PER MIN: Performed by: SURGERY

## 2025-06-02 RX ORDER — METOPROLOL TARTRATE 1 MG/ML
1-2 INJECTION, SOLUTION INTRAVENOUS EVERY 5 MIN PRN
Status: DISCONTINUED | OUTPATIENT
Start: 2025-06-02 | End: 2025-06-02 | Stop reason: HOSPADM

## 2025-06-02 RX ORDER — CEFAZOLIN SODIUM/WATER 2 G/20 ML
2 SYRINGE (ML) INTRAVENOUS
Status: COMPLETED | OUTPATIENT
Start: 2025-06-02 | End: 2025-06-02

## 2025-06-02 RX ORDER — DEXAMETHASONE SODIUM PHOSPHATE 4 MG/ML
4 INJECTION, SOLUTION INTRA-ARTICULAR; INTRALESIONAL; INTRAMUSCULAR; INTRAVENOUS; SOFT TISSUE
Status: DISCONTINUED | OUTPATIENT
Start: 2025-06-02 | End: 2025-06-02 | Stop reason: HOSPADM

## 2025-06-02 RX ORDER — ONDANSETRON 2 MG/ML
INJECTION INTRAMUSCULAR; INTRAVENOUS PRN
Status: DISCONTINUED | OUTPATIENT
Start: 2025-06-02 | End: 2025-06-02

## 2025-06-02 RX ORDER — HYDRALAZINE HYDROCHLORIDE 20 MG/ML
2.5-5 INJECTION INTRAMUSCULAR; INTRAVENOUS EVERY 10 MIN PRN
Status: DISCONTINUED | OUTPATIENT
Start: 2025-06-02 | End: 2025-06-02 | Stop reason: HOSPADM

## 2025-06-02 RX ORDER — LIDOCAINE 40 MG/G
CREAM TOPICAL
Status: DISCONTINUED | OUTPATIENT
Start: 2025-06-02 | End: 2025-06-02 | Stop reason: HOSPADM

## 2025-06-02 RX ORDER — ONDANSETRON 2 MG/ML
4 INJECTION INTRAMUSCULAR; INTRAVENOUS EVERY 30 MIN PRN
Status: DISCONTINUED | OUTPATIENT
Start: 2025-06-02 | End: 2025-06-02 | Stop reason: HOSPADM

## 2025-06-02 RX ORDER — INDOCYANINE GREEN AND WATER 25 MG
2.5 KIT INJECTION ONCE
Status: COMPLETED | OUTPATIENT
Start: 2025-06-02 | End: 2025-06-02

## 2025-06-02 RX ORDER — FENTANYL CITRATE 50 UG/ML
50 INJECTION, SOLUTION INTRAMUSCULAR; INTRAVENOUS EVERY 5 MIN PRN
Status: DISCONTINUED | OUTPATIENT
Start: 2025-06-02 | End: 2025-06-02 | Stop reason: HOSPADM

## 2025-06-02 RX ORDER — ONDANSETRON 4 MG/1
4 TABLET, ORALLY DISINTEGRATING ORAL EVERY 8 HOURS PRN
Qty: 10 TABLET | Refills: 0 | Status: SHIPPED | OUTPATIENT
Start: 2025-06-02

## 2025-06-02 RX ORDER — FENTANYL CITRATE 50 UG/ML
INJECTION, SOLUTION INTRAMUSCULAR; INTRAVENOUS PRN
Status: DISCONTINUED | OUTPATIENT
Start: 2025-06-02 | End: 2025-06-02

## 2025-06-02 RX ORDER — HYDROMORPHONE HCL IN WATER/PF 6 MG/30 ML
0.2 PATIENT CONTROLLED ANALGESIA SYRINGE INTRAVENOUS EVERY 5 MIN PRN
Status: DISCONTINUED | OUTPATIENT
Start: 2025-06-02 | End: 2025-06-02 | Stop reason: HOSPADM

## 2025-06-02 RX ORDER — ONDANSETRON 4 MG/1
4 TABLET, ORALLY DISINTEGRATING ORAL EVERY 30 MIN PRN
Status: DISCONTINUED | OUTPATIENT
Start: 2025-06-02 | End: 2025-06-02 | Stop reason: HOSPADM

## 2025-06-02 RX ORDER — DEXAMETHASONE SODIUM PHOSPHATE 4 MG/ML
INJECTION, SOLUTION INTRA-ARTICULAR; INTRALESIONAL; INTRAMUSCULAR; INTRAVENOUS; SOFT TISSUE PRN
Status: DISCONTINUED | OUTPATIENT
Start: 2025-06-02 | End: 2025-06-02

## 2025-06-02 RX ORDER — ACETAMINOPHEN 325 MG/1
975 TABLET ORAL ONCE
Status: COMPLETED | OUTPATIENT
Start: 2025-06-02 | End: 2025-06-02

## 2025-06-02 RX ORDER — PROPOFOL 10 MG/ML
INJECTION, EMULSION INTRAVENOUS PRN
Status: DISCONTINUED | OUTPATIENT
Start: 2025-06-02 | End: 2025-06-02

## 2025-06-02 RX ORDER — LIDOCAINE HYDROCHLORIDE 20 MG/ML
INJECTION, SOLUTION INFILTRATION; PERINEURAL PRN
Status: DISCONTINUED | OUTPATIENT
Start: 2025-06-02 | End: 2025-06-02

## 2025-06-02 RX ORDER — PROPOFOL 10 MG/ML
INJECTION, EMULSION INTRAVENOUS CONTINUOUS PRN
Status: DISCONTINUED | OUTPATIENT
Start: 2025-06-02 | End: 2025-06-02

## 2025-06-02 RX ORDER — BUPIVACAINE HYDROCHLORIDE AND EPINEPHRINE 5; 5 MG/ML; UG/ML
INJECTION, SOLUTION PERINEURAL PRN
Status: DISCONTINUED | OUTPATIENT
Start: 2025-06-02 | End: 2025-06-02 | Stop reason: HOSPADM

## 2025-06-02 RX ORDER — HYDROMORPHONE HCL IN WATER/PF 6 MG/30 ML
0.4 PATIENT CONTROLLED ANALGESIA SYRINGE INTRAVENOUS EVERY 5 MIN PRN
Status: DISCONTINUED | OUTPATIENT
Start: 2025-06-02 | End: 2025-06-02 | Stop reason: HOSPADM

## 2025-06-02 RX ORDER — KETOROLAC TROMETHAMINE 15 MG/ML
15 INJECTION, SOLUTION INTRAMUSCULAR; INTRAVENOUS
Status: DISCONTINUED | OUTPATIENT
Start: 2025-06-02 | End: 2025-06-02 | Stop reason: HOSPADM

## 2025-06-02 RX ORDER — OXYCODONE HYDROCHLORIDE 5 MG/1
5 TABLET ORAL
Status: COMPLETED | OUTPATIENT
Start: 2025-06-02 | End: 2025-06-02

## 2025-06-02 RX ORDER — CEFAZOLIN SODIUM/WATER 2 G/20 ML
2 SYRINGE (ML) INTRAVENOUS SEE ADMIN INSTRUCTIONS
Status: DISCONTINUED | OUTPATIENT
Start: 2025-06-02 | End: 2025-06-02 | Stop reason: HOSPADM

## 2025-06-02 RX ORDER — NALOXONE HYDROCHLORIDE 0.4 MG/ML
0.1 INJECTION, SOLUTION INTRAMUSCULAR; INTRAVENOUS; SUBCUTANEOUS
Status: DISCONTINUED | OUTPATIENT
Start: 2025-06-02 | End: 2025-06-02 | Stop reason: HOSPADM

## 2025-06-02 RX ORDER — SODIUM CHLORIDE, SODIUM LACTATE, POTASSIUM CHLORIDE, CALCIUM CHLORIDE 600; 310; 30; 20 MG/100ML; MG/100ML; MG/100ML; MG/100ML
INJECTION, SOLUTION INTRAVENOUS CONTINUOUS
Status: DISCONTINUED | OUTPATIENT
Start: 2025-06-02 | End: 2025-06-02 | Stop reason: HOSPADM

## 2025-06-02 RX ORDER — OXYCODONE HYDROCHLORIDE 5 MG/1
5-10 TABLET ORAL EVERY 4 HOURS PRN
Qty: 20 TABLET | Refills: 0 | Status: SHIPPED | OUTPATIENT
Start: 2025-06-02 | End: 2025-06-05

## 2025-06-02 RX ORDER — FENTANYL CITRATE 50 UG/ML
25 INJECTION, SOLUTION INTRAMUSCULAR; INTRAVENOUS EVERY 5 MIN PRN
Status: DISCONTINUED | OUTPATIENT
Start: 2025-06-02 | End: 2025-06-02 | Stop reason: HOSPADM

## 2025-06-02 RX ADMIN — FENTANYL CITRATE 100 MCG: 50 INJECTION INTRAMUSCULAR; INTRAVENOUS at 11:20

## 2025-06-02 RX ADMIN — HYDROMORPHONE HYDROCHLORIDE 0.5 MG: 1 INJECTION, SOLUTION INTRAMUSCULAR; INTRAVENOUS; SUBCUTANEOUS at 11:30

## 2025-06-02 RX ADMIN — PHENYLEPHRINE HYDROCHLORIDE 100 MCG: 10 INJECTION INTRAVENOUS at 11:46

## 2025-06-02 RX ADMIN — FENTANYL CITRATE 25 MCG: 50 INJECTION, SOLUTION INTRAMUSCULAR; INTRAVENOUS at 12:47

## 2025-06-02 RX ADMIN — OXYCODONE HYDROCHLORIDE 5 MG: 5 TABLET ORAL at 13:13

## 2025-06-02 RX ADMIN — LIDOCAINE HYDROCHLORIDE 50 MG: 20 INJECTION, SOLUTION INFILTRATION; PERINEURAL at 11:20

## 2025-06-02 RX ADMIN — MIDAZOLAM 2 MG: 1 INJECTION INTRAMUSCULAR; INTRAVENOUS at 11:26

## 2025-06-02 RX ADMIN — INDOCYANINE GREEN AND WATER 2.5 MG: KIT at 09:49

## 2025-06-02 RX ADMIN — ONDANSETRON 4 MG: 2 INJECTION INTRAMUSCULAR; INTRAVENOUS at 12:10

## 2025-06-02 RX ADMIN — DEXAMETHASONE SODIUM PHOSPHATE 4 MG: 4 INJECTION, SOLUTION INTRA-ARTICULAR; INTRALESIONAL; INTRAMUSCULAR; INTRAVENOUS; SOFT TISSUE at 11:21

## 2025-06-02 RX ADMIN — Medication 2 G: at 11:07

## 2025-06-02 RX ADMIN — PROPOFOL 50 MCG/KG/MIN: 10 INJECTION, EMULSION INTRAVENOUS at 11:17

## 2025-06-02 RX ADMIN — PROPOFOL 200 MG: 10 INJECTION, EMULSION INTRAVENOUS at 11:20

## 2025-06-02 RX ADMIN — FENTANYL CITRATE 50 MCG: 50 INJECTION, SOLUTION INTRAMUSCULAR; INTRAVENOUS at 13:02

## 2025-06-02 RX ADMIN — SODIUM CHLORIDE, SODIUM LACTATE, POTASSIUM CHLORIDE, AND CALCIUM CHLORIDE: .6; .31; .03; .02 INJECTION, SOLUTION INTRAVENOUS at 09:45

## 2025-06-02 RX ADMIN — ROCURONIUM BROMIDE 50 MG: 50 INJECTION, SOLUTION INTRAVENOUS at 11:20

## 2025-06-02 RX ADMIN — FENTANYL CITRATE 25 MCG: 50 INJECTION, SOLUTION INTRAMUSCULAR; INTRAVENOUS at 12:39

## 2025-06-02 RX ADMIN — ROCURONIUM BROMIDE 20 MG: 50 INJECTION, SOLUTION INTRAVENOUS at 11:41

## 2025-06-02 RX ADMIN — SUGAMMADEX 200 MG: 100 INJECTION, SOLUTION INTRAVENOUS at 12:10

## 2025-06-02 RX ADMIN — ACETAMINOPHEN 975 MG: 325 TABLET, FILM COATED ORAL at 13:12

## 2025-06-02 ASSESSMENT — ACTIVITIES OF DAILY LIVING (ADL)
ADLS_ACUITY_SCORE: 22

## 2025-06-02 NOTE — DISCHARGE INSTRUCTIONS
HOME CARE FOLLOWING LAPAROSCOPIC CHOLECYSTECTOMY  HIRAL Mckoy, ARTURO Medina C. Pratt, J. Shaheen    INCISIONAL CARE:  Replace the bandage over your incisions DAILY until all drainage stops, or if more comfortable to have in place.  If present, leave the steri-strips (white paper tapes) in place for 14 days after surgery.  If Dermabond (a type of skin glue) is present, leave in place until it wears/flakes off (2-3 weeks).     BATHING:  OK to shower 48 hours after surgery.  Avoid baths for 1 week after surgery.  You may wash your hair at any time.  Gently pat your incision dry after bathing.  Do not apply lotions, creams, or ointments to incisions.    ACTIVITY:  Light Activity -- you may immediately be up and about as tolerated.  Walking is encouraged, increase as tolerated.  Driving/Light Work-- when comfortable and off narcotic pain medications.  Strenuous Work/Activity -- limit lifting to 20 pounds for 2 weeks.  Progressively increase with time.  Active Sports (running, biking, etc.) -- cautiously resume after 2 weeks.    DISCOMFORT:  Local anesthetic placed at surgery should provide relief for 4-8 hours.  Begin taking pain pills before discomfort is severe.  Take the pain medication with some food, when possible, to minimize side effects.  Intermittent use of ice packs may help during the first 1-3 weeks after surgery.  Expect gradual improvement.    Over-the-counter anti-inflammatory medications (i.e. Ibuprofen/Advil/Motrin or Naprosyn/Aleve) may be used per package instructions in addition to or while tapering off the narcotic pain medications to decrease swelling and sensitivity.  DO NOT TAKE these Anti-inflammatory medications if your primary physician has advised against doing so, or if you have acid reflux, ulcer, or bleeding disorder, or take blood-thinner medications.  Call your primary physician or the surgery office if you have medication questions.    After laparoscopic  cholecystectomy, you may have shoulder or upper back discomfort due to the gas used during surgery.  This is temporary and should resolve within 2-3 days.  Frequent short walks may help with this.  You may have decreased energy level for 1-2 weeks after surgery related to your recovery.    DIET:  Start with liquids and gradually increase diet as tolerated.  Drink plenty of fluids.  While taking pain medications, consider use of a stool softener, increase your fiber in your diet, or add a fiber supplement (like Metamucil, Citrucel) to help prevent constipation - a possible side effect of pain medications.  It is not uncommon to experience some bowel changes (loose stools or constipation) after surgery.  Your body has to adapt to you no longer having a gall bladder.  To help minimize this side effect, avoid fatty foods for 1-2 weeks after surgery.  You may then slowly increase the amount of fatty foods in your diet.      NAUSEA:  If nauseated from the anesthetic/pain meds; rest in bed, get up cautiously with assistance, and drink clear liquids (juice, tea, broth).    FOLLOW-UP AFTER SURGERY:  -Our office will contact you approximately 2-3 weeks after surgery to check on your progress and answer any questions you may have.  If you are doing well, you will not need to return for an office appointment.  If any concerns are identified over the phone, we will help you make an appointment to see a provider.    -If you have not received a phone call, have any questions or concerns, or would like to be seen, please call us at [phone].  We are located at: [address], [address]; [place] [address]    -CONTACT US IF THE FOLLOWING DEVELOPS:   1. A fever that is above 101     2. Increased redness, warmth, drainage, bleeding, or swelling.   3. Pain that is not relieved by rest/ice and your prescription.   4.  Increasing pain after 48 hours.   5. Drainage that is thick, cloudy, yellow, green or white.   6. Any other  questions or concerns.      FREQUENTLY ASKED QUESTIONS:    Q:  How should my incision look?    A:  Normally your incision will appear slightly swollen with light redness directly along the incision itself as it heals.  It may feel like a bump or ridge as the healing/scarring happens, and over time (3-4 months) this bump or ridge feeling should slowly go away.  In general, clear or pink watery drainage can be normal at first as your incision heals, but should decrease over time.    Q:  How do I know if my incision is infected?  A:  Look at your incision for signs of infection, like redness around the incision spreading to surrounding skin, or drainage of cloudy or foul-smelling drainage.  If you feel warm, check your temperature to see if you are running a fever.    **If any of these things occur, please notify the nurse at our office.  We may need you to come into the office for an incision check.      Q:  How do I take care of my incision?  A:  If you have a dressing in place - Starting the day after surgery, replace the dressing 1-2 times a day until there is no further drainage from the incision.  At that time, a dressing is no longer needed.  Try to minimize tape on the skin if irritation is occurring at the tape sites.  If you have significant irritation from tape on the skin, please call the office to discuss other method of dressing your incision.    Small pieces of tape called  steri-strips  may be present directly overlying your incision; these may be removed 10 days after surgery unless otherwise specified by your surgeon.  If these tapes start to loosen at the ends, you may trim them back until they fall off or are removed.    A:  If you had  Dermabond  tissue glue used as a dressing (this causes your incision to look shiny with a clear covering over it) - This type of dressing wears off with time and does not require more dressings over the top unless it is draining around the glue as it wears off.  Do  not apply ointments or lotions over the incisions until the glue has completely worn off.    Q:  There is a piece of tape or a sticky  lead  still on my skin.  Can I remove this?  A:  Sometimes the sticky  leads  used for monitoring during surgery or for evaluation in the emergency department are not all removed while you are in the hospital.  These sometimes have a tab or metal dot on them.  You can easily remove these on your own, like taking off a band-aid.  If there is a gel substance under the  lead , simply wipe/clean it off with a washcloth or paper towel.      Q:  What can I do to minimize constipation (very hard stools, or lack of stools)?  A:  Stay well hydrated.  Increase your dietary fiber intake or take a fiber supplement -with plenty of water.  Walk around frequently.  You may consider an over-the-counter stool-softener.  Your Pharmacist can assist you with choosing one that is stocked at your pharmacy.  Constipation is also one of the most common side effects of pain medication.  If you are using pain medication, be pro-active and try to PREVENT problems with constipation by taking the steps above BEFORE constipation becomes a problem.    Q:  What do I do if I need more pain medications?  A:  Call the office to receive refills.  Be aware that certain pain meds cannot be called into a pharmacy and actually require a paper prescription.  A change may be made in your pain med as you progress thru your recovery period or if you have side effects to certain meds.    --Pain meds are NOT refilled after 5pm on weekdays, and NOT AT ALL on the weekends, so please look ahead to prevent problems.      Q:  Why am I having a hard time sleeping now that I am at home?  A:  Many medications you receive while you are in the hospital can impact your sleep for a number of days after your surgery/hospitalization.  Decreased level of activity and naps during the day may also make sleeping at night difficult.  Try to  minimize day-time naps, and get up frequently during the day to walk around your home during your recovery time.  Sleep aides may be of some help, but are not recommended for long-term use.      Q:  I am having some back discomfort.  What should I do?  A:  This may be related to certain positioning that was required for your surgery, extended periods of time in bed, or other changes in your overall activity level.  You may try ice, heat, acetaminophen, or ibuprofen to treat this temporarily.  Note that many pain medications have acetaminophen in them and would state this on the prescription bottle.  Be sure not to exceed the maximum of 4000mg per day of acetaminophen.     **If the pain you are having does not resolve, is severe, or is a flare of back pain you have had on other occasions prior to surgery, please contact your primary physician for further recommendations or for an appointment to be examined at their office.    Q:  Why am I having headaches?  A:  Headaches can be caused by many things:  caffeine withdrawal, use of pain meds, dehydration, high blood pressure, lack of sleep, over-activity/exhaustion, flare-up of usual migraine headaches.  If you feel this is related to muscle tension (a band-like feeling around the head, or a pressure at the low-back of the head) you may try ice or heat to this area.  You may need to drink more fluids (try electrolyte drink like Gatorade), rest, or take your usual migraine medications.   **If your headaches do not resolve, worsen, are accompanied by other symptoms, or if your blood pressure is high, please call your primary physician for recommendation and/or examination.    Q:  I am unable to urinate.  What do I do?  A:  A small percentage of people can have difficulty urinating initially after surgery.  This includes being able to urinate only a very small amount at a time and feeling discomfort or pressure in the very low abdomen.  This is called  urinary retention ,  and is actually an urgent situation.  Proceed to your nearest Emergency department for evaluation (not an Urgent Care Center).  Sometimes the bladder does not work correctly after certain medications you receive during surgery, or related to certain procedures.  You may need to have a catheter placed until your bladder recovers.  When planning to go to an Emergency department, it may help to call the ER to let them know you are coming in for this problem after a surgery.  This may help you get in quicker to be evaluated.  **If you have symptoms of a urinary tract infection, please contact your primary physician for the proper evaluation and treatment.          If you have other questions, please call the office Monday thru Friday between 8am and 4:30pm to discuss with the nurse or physician assistant.  [phone]    There is a surgeon ON CALL on weekday evenings and over the weekend in case of urgent need only, and may be contacted at the same number.    If you are having an emergency, call 911 or proceed to your nearest emergency department.        Maximum ibuprofen (Advil) dose from all sources should not exceed 2.5 grams (2,400 mg) per day.   Maximum acetaminophen (Tylenol) dose from all sources should not exceed 4 grams (4000 mg) per day.

## 2025-06-02 NOTE — ANESTHESIA PREPROCEDURE EVALUATION
Anesthesia Pre-Procedure Evaluation    Patient: Deandre Lemus   MRN: [id number] : 1980          Procedure : Procedure(s):  CHOLECYSTECTOMY, LAPAROSCOPIC, WITH CHOLANGIOGRAM         Past Medical History:   Diagnosis Date    Closed fracture of left hip with routine healing         Gastric leiomyoma     Idiopathic acute pancreatitis     RBBB (right bundle branch block)       Past Surgical History:   Procedure Laterality Date    BIOPSY  10/31/2023    Biopsy of Esophageal Mass    EYE SURGERY  2007    PRK    VASECTOMY      WISDOM TOOTH EXTRACTION        Allergies   Allergen Reactions    Latex Rash     Band aid      Social History     Tobacco Use    Smoking status: Former     Current packs/day: 0.00     Average packs/day: 1 pack/day for 14.4 years (14.4 ttl pk-yrs)     Types: Cigarettes, Other     Start date: 1997     Quit date: 2011     Years since quittin.4     Passive exposure: Never    Smokeless tobacco: Former     Quit date: 2021    Tobacco comments:     Former user; no interest in using again   Substance Use Topics    Alcohol use: Not Currently     Comment: Quit approx 2022; no desire to continue      Wt Readings from Last 1 Encounters:   25 97.3 kg (214 lb 8 oz)        Anesthesia Evaluation   Pt has had prior anesthetic. Type: General.        ROS/MED HX  ENT/Pulmonary:       Neurologic:       Cardiovascular:       METS/Exercise Tolerance:     Hematologic: Comments: Lab Test        25                       0536          0808          0709          WBC          9.5          13.1*        17.3*         HGB          13.9         13.3         14.8          MCV          85           86           86            PLT          166          152          152            Lab Test        25                       1358          0536          0709          NA           139          134*         132*          POTASSIUM     "4.4          3.7          3.9           CHLORIDE     106          99           98            CO2          23           25           23            BUN          16.3         13.3         17.5          CR           1.00         0.94         1.01          ANIONGAP     10           10           11            WAQAS          10.0         9.1          8.9           GLC          112*         108*         127*                Musculoskeletal:       GI/Hepatic:     (+)             liver disease,       Renal/Genitourinary:       Endo:     (+)               Obesity,       Psychiatric/Substance Use:       Infectious Disease:       Malignancy:       Other:              Physical Exam  Airway  Mallampati: II  TM distance: >3 FB  Neck ROM: full  Mouth opening: >= 4 cm    Cardiovascular - normal exam   Dental   (+) Minor Abnormalities - some fillings, tiny chips      Pulmonary - normal exam      Neurological - normal exam  He appears awake, alert and oriented x3.    Other Findings       OUTSIDE LABS:  CBC:   Lab Results   Component Value Date    WBC 9.5 03/27/2025    WBC 13.1 (H) 03/26/2025    HGB 13.9 03/27/2025    HGB 13.3 03/26/2025    HCT 40.0 03/27/2025    HCT 38.3 (L) 03/26/2025     03/27/2025     03/26/2025     BMP:   Lab Results   Component Value Date     04/18/2025     (L) 03/27/2025    POTASSIUM 4.4 04/18/2025    POTASSIUM 3.7 03/27/2025    CHLORIDE 106 04/18/2025    CHLORIDE 99 03/27/2025    CO2 23 04/18/2025    CO2 25 03/27/2025    BUN 16.3 04/18/2025    BUN 13.3 03/27/2025    CR 1.00 04/18/2025    CR 0.94 03/27/2025     (H) 04/18/2025     (H) 03/27/2025     COAGS: No results found for: \"PTT\", \"INR\", \"FIBR\"  POC: No results found for: \"BGM\", \"HCG\", \"HCGS\"  HEPATIC:   Lab Results   Component Value Date    ALBUMIN 4.5 04/18/2025    PROTTOTAL 7.4 04/18/2025    ALT 25 04/18/2025    AST 31 04/18/2025    ALKPHOS 69 04/18/2025    BILITOTAL 0.4 04/18/2025     OTHER:   Lab Results   Component " "Value Date    A1C 5.8 (H) 04/18/2025    WAQAS 10.0 04/18/2025    LIPASE 266 (H) 03/24/2025    TSH 0.68 10/14/2024       Anesthesia Plan    ASA Status:  2      NPO Status: NPO Appropriate   Anesthesia Type: General.  Induction: intravenous.  Maintenance: Balanced.   Techniques and Equipment:       - Monitoring Plan: standard ASA monitoring     Consents    Anesthesia Plan(s) and associated risks, benefits, and realistic alternatives discussed. Questions answered and patient/representative(s) expressed understanding.     - Discussed: anesthesiologist     - Discussed with:  Patient        - Pt is DNR/DNI Status: no DNR     Blood Consent:      - Discussed with: patient.     Postoperative Care    Pain management: plan for postoperative opioid use.     Comments:                   Chandana Parker MD    I have reviewed the pertinent notes and labs in the chart from the past 30 days and (re)examined the patient.  Any updates or changes from those notes are reflected in this note.    Clinically Significant Risk Factors Present on Admission                             # Obesity: Estimated body mass index is 30.78 kg/m  as calculated from the following:    Height as of this encounter: 1.778 m (5' 10\").    Weight as of this encounter: 97.3 kg (214 lb 8 oz).                    "

## 2025-06-02 NOTE — ANESTHESIA POSTPROCEDURE EVALUATION
Patient: Deandre Lemus    Procedure: Procedure(s):  CHOLECYSTECTOMY, LAPAROSCOPIC       Anesthesia Type:  General    Note:  Disposition: Inpatient   Postop Pain Control: Uneventful            Sign Out: Well controlled pain   PONV: No   Neuro/Psych: Uneventful            Sign Out: Acceptable/Baseline neuro status   Airway/Respiratory: Uneventful            Sign Out: Acceptable/Baseline resp. status   CV/Hemodynamics: Uneventful            Sign Out: Acceptable CV status; No obvious hypovolemia; No obvious fluid overload   Other NRE: NONE   DID A NON-ROUTINE EVENT OCCUR? No           Last vitals:  Vitals Value Taken Time   /78 06/02/25 12:55   Temp 97.88  F (36.6  C) 06/02/25 12:58   Pulse 62 06/02/25 12:58   Resp 9 06/02/25 12:58   SpO2 93 % 06/02/25 12:58   Vitals shown include unfiled device data.    Electronically Signed By: Chandana Parker MD  June 2, 2025  12:58 PM

## 2025-06-02 NOTE — ANESTHESIA PROCEDURE NOTES
Airway       Patient location during procedure: OR       Procedure Start/Stop Times: 6/2/2025 11:20 AM  Staff -        Anesthesiologist:  Chandana Parker MD       CRNA: Deedee Chavez APRN CRNA       Performed By: CRNA  Consent for Airway        Urgency: elective  Indications and Patient Condition       Indications for airway management: rakesh-procedural       Induction type:intravenous       Mask difficulty assessment: 1 - vent by mask    Final Airway Details       Final airway type: endotracheal airway       Successful airway: ETT - single and Oral  Endotracheal Airway Details        ETT size (mm): 8.0       Cuffed: yes       Successful intubation technique: direct laryngoscopy       DL Blade Type: Reece 2       Grade View of Cords: 2       Adjucts: stylet       Position: Right       Measured from: gums/teeth       Secured at (cm): 25       Bite block used: None    Post intubation assessment        Placement verified by: capnometry, equal breath sounds and chest rise        Number of attempts at approach: 1       Number of other approaches attempted: 0       Secured with: tape       Ease of procedure: easy       Dentition: Lips/oral mucosa injury (lower right lip knick with DL otherwise unchanged teeth)    Medication(s) Administered   Medication Administration Time: 6/2/2025 11:20 AM

## 2025-06-02 NOTE — OP NOTE
General Surgery Operative Note    Pre-operative diagnosis:  Biliary sludge [K83.8]   Post-operative diagnosis: same   Procedure: Laparoscopic Cholecystectomy, attempted cholangiogram   Surgeon: Lux Carrasco MD   Assistant(s): Martha Allison PA-C - the physician assistant was medically necessary to assist in prepping, positioning, camera operation, retraction/exposure and closure of the port site.    Anesthesia: General    Estimated blood loss: 5 cc's   Drains placed: None   Complications:  None   Findings:  Gallbladder with adhesions suggesting previous inflammation.  No evidence of acute inflammation.  Cholangiogram was attempted, but we were unable to thread the cholangiogram catheter due to a cystic duct valve.  The cholangiogram was therefore aborted.     INDICATIONS FOR OPERATION: This is a patient with prior episodes of pancreatitis.  The patient was found to have sludge in the gallbladder.  Laparoscopic cholecystectomy with cholangiogram was recommended, in the hopes that this would reduce the chance of future episodes of pancreatitis.  The procedure along with its risks and complications was discussed in detail and the patient agreed to proceed.    DETAILS OF THE OPERATION: After informed consent the patient was taken to the operating room where he underwent satisfactory induction of general anesthesia.  The patient was then sterilely prepped and draped.  A supraumbilical skin incision was made using a skin knife.  The dissection was carried bluntly down to the fascia.  The fascia was opened using electrocautery and the Franco trocar was then inserted.  Pneumoperitoneum was achieved using CO2 insufflation, and under direct visualization  three  5 mm upper abdominal ports were placed.  The gallbladder was visualized and was grasped.  Adhesions to the patient's the gallbladder were taken down using electrocautery and blunt dissection.  This was suggestive of prior gallbladder inflammation.  The dome of  the gallbladder was then it was pulled up over the liver and the cystic duct was exposed.  The cystic duct was skeletonized, and a single clip was placed on the gallbladder end.  The cystic duct anatomy was then performed and we attempted to thread a cholangiogram catheter into the cystic duct.  Unfortunately, I was not able to pass this due to the valve in the cystic duct.  We tried an additional incision a bit closer to the common bile duct, but we were still unable to pass the catheter.  The cholangiogram procedure was therefore aborted.  The cystic duct was now triple clipped and divided.  The cystic artery was likewise triple clipped and divided, along with any posterior branches.  The gallbladder was then dissected away from the liver using electrocautery.  The gallbladder was then placed in an Endo Catch bag and removed through the supraumbilical incision.  The gallbladder fossa was irrigated out.  There was excellent hemostasis and the clips were in good position.  The trocar sites were now infiltrated with half percent Marcaine with epinephrine.  The trochars were removed under direct visualization.  The supraumbilical fascia was then closed using 0 Vicryl suture.  Skin incisions were closed using 4-0 subcuticular Vicryl followed by Steri-Strips.    The patient was transferred to the recovery room in satisfactory condition.  Sponge and needle counts were correct at the close of the case.      Specimens:   ID Type Source Tests Collected by Time Destination   1 : GALLBLADDER AND CONTENTS Tissue Gallbladder SURGICAL PATHOLOGY EXAM Lux Carrasco MD 6/2/2025 12:05 PM            Lux Carrasco MD

## 2025-06-02 NOTE — ANESTHESIA CARE TRANSFER NOTE
Patient: Deandre Lemus    Procedure: Procedure(s):  CHOLECYSTECTOMY, LAPAROSCOPIC       Diagnosis: Biliary sludge [K83.8]  Diagnosis Additional Information: No value filed.    Anesthesia Type:   General     Note:    Oropharynx: nasal airway in place and spontaneously breathing  Level of Consciousness: drowsy  Oxygen Supplementation: face mask  Level of Supplemental Oxygen (L/min / FiO2): 6  Independent Airway: airway patency satisfactory and stable  Dentition: dentition unchanged  Vital Signs Stable: post-procedure vital signs reviewed and stable  Report to RN Given: handoff report given  Patient transferred to: PACU    Handoff Report: Identifed the Patient, Identified the Reponsible Provider, Reviewed the pertinent medical history, Discussed the surgical course, Reviewed Intra-OP anesthesia mangement and issues during anesthesia, Set expectations for post-procedure period and Allowed opportunity for questions and acknowledgement of understanding      Vitals:  Vitals Value Taken Time   /89 06/02/25 12:25   Temp 97.52  F (36.4  C) 06/02/25 12:25   Pulse 70 06/02/25 12:25   Resp 16 06/02/25 12:25   SpO2 100 % 06/02/25 12:25   Vitals shown include unfiled device data.    Electronically Signed By: KENDRA Burton CRNA  June 2, 2025  12:25 PM

## 2025-06-03 ENCOUNTER — RESULTS FOLLOW-UP (OUTPATIENT)
Dept: SURGERY | Facility: CLINIC | Age: 45
End: 2025-06-03

## 2025-06-03 LAB
PATH REPORT.COMMENTS IMP SPEC: NORMAL
PATH REPORT.COMMENTS IMP SPEC: NORMAL
PATH REPORT.FINAL DX SPEC: NORMAL
PATH REPORT.GROSS SPEC: NORMAL
PATH REPORT.MICROSCOPIC SPEC OTHER STN: NORMAL
PATH REPORT.RELEVANT HX SPEC: NORMAL
PHOTO IMAGE: NORMAL

## 2025-06-05 ENCOUNTER — TELEPHONE (OUTPATIENT)
Dept: SURGERY | Facility: CLINIC | Age: 45
End: 2025-06-05
Payer: COMMERCIAL

## 2025-06-05 DIAGNOSIS — K83.8 BILIARY SLUDGE: ICD-10-CM

## 2025-06-05 RX ORDER — OXYCODONE HYDROCHLORIDE 5 MG/1
5-10 TABLET ORAL EVERY 6 HOURS PRN
Qty: 20 TABLET | Refills: 0 | Status: SHIPPED | OUTPATIENT
Start: 2025-06-05

## 2025-06-05 NOTE — TELEPHONE ENCOUNTER
Name of caller: Patient    Reason for Call:  calling for pain med refill     Surgeon:  Guerrero Carrasco MD    Recent Surgery:  Yes.    If yes, when & what type:      Laparoscopic Cholecystectomy, attempted cholangiogram   6/2      Best phone number to reach pt at is: [phone]  Ok to leave a message with medical info? Yes.    Pharmacy preferred (if calling for a refill): [facility]

## 2025-06-05 NOTE — TELEPHONE ENCOUNTER
Procedure: Laparoscopic cholecystectomy, attempted cholangiogram    Date: 6/2/25    Surgeon: Luna    Patient requesting refill of oxycodone. Currently has #4 left. Does not want to run out prior to the weekend.    He reports that he has been trying to wean off/extend time between oxycodone doses. Has also been trying to use 1 tab vs 2 tabs. But, does still need to use 2 tabs, especially in the morning and at night before bed.    He is also taking tylenol every ~ 6 hours.    He reports pain is a constant 2 out of 10 with pain meds on board.  When meds wear off, pain shoots up to a 6 or 7 out of 10.    He does not take ibuprofen, as he feels that he used too much ibuprofen in the past when he was in the     Informed him that I will route call to PA team for refill.  Generally, one refill is approved. If he feels the need for any further refills, he will need an office visit first.    He verbalized understanding of this and agreed.    He will wait for call back informing him rx sent    Marley Bender, RN-BSN

## 2025-06-24 ENCOUNTER — OFFICE VISIT (OUTPATIENT)
Dept: SURGERY | Facility: CLINIC | Age: 45
End: 2025-06-24
Payer: COMMERCIAL

## 2025-06-24 VITALS
HEIGHT: 70 IN | BODY MASS INDEX: 30.64 KG/M2 | OXYGEN SATURATION: 97 % | DIASTOLIC BLOOD PRESSURE: 78 MMHG | SYSTOLIC BLOOD PRESSURE: 124 MMHG | WEIGHT: 214 LBS | HEART RATE: 79 BPM

## 2025-06-24 DIAGNOSIS — Z09 SURGICAL FOLLOWUP VISIT: Primary | ICD-10-CM

## 2025-06-24 PROCEDURE — 3074F SYST BP LT 130 MM HG: CPT | Performed by: SURGERY

## 2025-06-24 PROCEDURE — 99024 POSTOP FOLLOW-UP VISIT: CPT | Performed by: SURGERY

## 2025-06-24 PROCEDURE — 3078F DIAST BP <80 MM HG: CPT | Performed by: SURGERY

## 2025-06-24 NOTE — PROGRESS NOTES
The patient returns today to follow-up after his laparoscopic cholecystectomy.  In general, he is feeling well.  He does note that when he starts to perform physical activity, he still has a fair amount of discomfort.  He is employed as a , and feels he is not quite ready to get back to unrestricted activity.    The patient's incisions are well-healed.  There is a slight healing ridge at the supraumbilical incision, as expected.    We will move the patient's return to work to July 7 with 2 weeks of light duty following that (specifically no lifting more than 25 pounds).  The patient may return to see me on an as needed basis.    Lux Carrasco MD  [facility]    Please route or send letter to:  Primary Care Provider (PCP)

## 2025-06-24 NOTE — Clinical Note
2025    Deandre OCHOA Mariah   1980        To Whom it May Concern;    Please excuse Deandre Lemus from work/school for a healthcare visit on 2025.    Sincerely,        Lux Carrasco MD

## 2025-06-24 NOTE — LETTER
2025    RE: Deandre Lemus  :  1980    This is to certify that Deandre Lemus has been under my care for surgery.      Patient is NOT able to return to work/school at all from:    to      Patient is able to return to work/school with restrictions from     to    Restrictions are:     May wear equipment as tolerated for up to 3 hours at a time.  May carry and use firearms without restrictions.  Limited physical contact with the public.  Refrain from heavy lifting, excessive physical exertion and hands on contact.  May perform all other duties as tolerated.      Patient is able to return to work/school without restrictions as of       Subject to change due to healing process.  If you have any questions please feel free to call our clinic at [phone] and speak with nursing.    Sincerely,          Lux Carrasco MD      Electronically signed

## 2025-08-18 ENCOUNTER — TRANSFERRED RECORDS (OUTPATIENT)
Dept: ADMINISTRATIVE | Facility: CLINIC | Age: 45
End: 2025-08-18
Payer: COMMERCIAL

## (undated) DEVICE — LINEN FULL SHEET 5511

## (undated) DEVICE — ESU CORD MONOPOLAR 10'  E0510

## (undated) DEVICE — CLIP APPLIER ENDO 5MM M/L LIGAMAX EL5ML

## (undated) DEVICE — ENDO TROCAR OPTICAL ACCESS KII Z-THRD 12X100MM C0R85

## (undated) DEVICE — LINEN POUCH DBL 5427

## (undated) DEVICE — Device

## (undated) DEVICE — PREP CHLORAPREP 26ML TINTED HI-LITE ORANGE 930815

## (undated) DEVICE — VIAL DECANTER STERILE WHITE DYNJDEC06

## (undated) DEVICE — CATH CHOLANGIOGRAM 4.5FR TAUT METAL TIP 20018-M55

## (undated) DEVICE — CATH IV ANGIO INTRO 12GA 382277

## (undated) DEVICE — LINEN HALF SHEET 5512

## (undated) DEVICE — BAG CLEAR TRASH 1.3M 39X33" P4040C

## (undated) DEVICE — ESU GROUND PAD ADULT W/CORD E7507

## (undated) DEVICE — ENDO TROCAR FIRST ENTRY KII FIOS Z-THRD 05X100MM CTF03

## (undated) DEVICE — STOPCOCK DISCOFIX 3 WAY 456003

## (undated) DEVICE — ENDO POUCH UNIV RETRIEVAL SYSTEM INZII 10MM CD001

## (undated) DEVICE — ENDO TROCAR SLEEVE KII Z-THREADED 05X100MM CTS02

## (undated) DEVICE — DRAPE LEGGINGS 8421

## (undated) DEVICE — SUTURE VICRYL+ 0 CT-2 18" VCP727H

## (undated) DEVICE — TUBING SPINLOCK IV EXTENSION SET 30" STD BORE 473012

## (undated) DEVICE — ESU PENCIL W/HOLSTER E2350H

## (undated) DEVICE — SYR 30ML LL W/O NDL 302832

## (undated) DEVICE — SU VICRYL+ 4-0 18IN P-3 UND VCP494G

## (undated) DEVICE — LINEN TOWEL PACK X5 5464

## (undated) DEVICE — ESU ELEC BLADE 2.75" COATED/INSULATED E1455

## (undated) DEVICE — SOLUTION IV IRRIGATION 0.9% NACL 3L R8206

## (undated) DEVICE — SUCTION IRR STRYKERFLOW II W/TIP 250-070-520

## (undated) DEVICE — CONTRAST ISOVUE 300 61% IOPAMIDOL 10X30ML VIAL 131525

## (undated) DEVICE — SOLUTION IV 0.9% NACL 250ML L8002

## (undated) DEVICE — SOLUTION IRRIGATION 0.9% NACL 1000ML BOTTLE R5200-01

## (undated) DEVICE — DRAPE C-ARM 60X42" 1013

## (undated) DEVICE — BAG DECANTER STERILE WHITE DYNJDEC09

## (undated) RX ORDER — FENTANYL CITRATE 50 UG/ML
INJECTION, SOLUTION INTRAMUSCULAR; INTRAVENOUS
Status: DISPENSED
Start: 2025-06-02

## (undated) RX ORDER — OXYCODONE HYDROCHLORIDE 5 MG/1
TABLET ORAL
Status: DISPENSED
Start: 2025-06-02

## (undated) RX ORDER — BUPIVACAINE HYDROCHLORIDE AND EPINEPHRINE 5; 5 MG/ML; UG/ML
INJECTION, SOLUTION EPIDURAL; INTRACAUDAL; PERINEURAL
Status: DISPENSED
Start: 2025-06-02

## (undated) RX ORDER — INDOCYANINE GREEN AND WATER 25 MG
KIT INJECTION
Status: DISPENSED
Start: 2025-06-02

## (undated) RX ORDER — ACETAMINOPHEN 325 MG/1
TABLET ORAL
Status: DISPENSED
Start: 2025-06-02

## (undated) RX ORDER — CEFAZOLIN SODIUM/WATER 2 G/20 ML
SYRINGE (ML) INTRAVENOUS
Status: DISPENSED
Start: 2025-06-02

## (undated) RX ORDER — FENTANYL CITRATE-0.9 % NACL/PF 10 MCG/ML
PLASTIC BAG, INJECTION (ML) INTRAVENOUS
Status: DISPENSED
Start: 2025-06-02